# Patient Record
Sex: MALE | Race: BLACK OR AFRICAN AMERICAN | NOT HISPANIC OR LATINO | Employment: OTHER | URBAN - METROPOLITAN AREA
[De-identification: names, ages, dates, MRNs, and addresses within clinical notes are randomized per-mention and may not be internally consistent; named-entity substitution may affect disease eponyms.]

---

## 2017-09-20 ENCOUNTER — HOSPITAL ENCOUNTER (EMERGENCY)
Facility: HOSPITAL | Age: 57
Discharge: HOME | End: 2017-09-20
Attending: EMERGENCY MEDICINE

## 2017-09-20 VITALS
WEIGHT: 163 LBS | RESPIRATION RATE: 18 BRPM | BODY MASS INDEX: 27.16 KG/M2 | OXYGEN SATURATION: 96 % | HEIGHT: 65 IN | DIASTOLIC BLOOD PRESSURE: 88 MMHG | HEART RATE: 60 BPM | SYSTOLIC BLOOD PRESSURE: 129 MMHG | TEMPERATURE: 98 F

## 2017-09-20 DIAGNOSIS — S46.912A SHOULDER STRAIN, LEFT, INITIAL ENCOUNTER: ICD-10-CM
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
## 2017-09-20 DIAGNOSIS — M79.632 PAIN OF LEFT FOREARM: ICD-10-CM

## 2017-09-20 DIAGNOSIS — R07.81 RIB PAIN ON LEFT SIDE: ICD-10-CM

## 2017-09-20 DIAGNOSIS — S39.012A BACK STRAIN, INITIAL ENCOUNTER: ICD-10-CM

## 2017-09-20 PROCEDURE — 99283 EMERGENCY DEPT VISIT LOW MDM: CPT | Mod: 25

## 2017-09-20 PROCEDURE — 63600175 PHARM REV CODE 636 W HCPCS: Performed by: NURSE PRACTITIONER

## 2017-09-20 PROCEDURE — 96372 THER/PROPH/DIAG INJ SC/IM: CPT

## 2017-09-20 RX ORDER — METHOCARBAMOL 500 MG/1
1000 TABLET, FILM COATED ORAL 3 TIMES DAILY PRN
Qty: 18 TABLET | Refills: 0 | Status: SHIPPED | OUTPATIENT
Start: 2017-09-20

## 2017-09-20 RX ORDER — NAPROXEN 500 MG/1
500 TABLET ORAL 2 TIMES DAILY PRN
Qty: 10 TABLET | Refills: 0 | Status: SHIPPED | OUTPATIENT
Start: 2017-09-20 | End: 2017-09-25

## 2017-09-20 RX ORDER — KETOROLAC TROMETHAMINE 30 MG/ML
30 INJECTION, SOLUTION INTRAMUSCULAR; INTRAVENOUS
Status: COMPLETED | OUTPATIENT
Start: 2017-09-20 | End: 2017-09-20

## 2017-09-20 RX ADMIN — KETOROLAC TROMETHAMINE 30 MG: 30 INJECTION, SOLUTION INTRAMUSCULAR at 09:09
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
## 2017-09-21 NOTE — DISCHARGE INSTRUCTIONS
Please return to the Emergency Department for any new or worsening symptoms including: Worsening pain, any difficulty breathing, fever, chest pain, shortness of breath, loss of consciousness, dizziness, weakness, or any other concerns.     Please follow up with your Primary Care Provider within in the week. If you do not have one, you may contact the one listed on your discharge paperwork or you may also call the [facility] Appointment Desk at [phone] to schedule an appointment with one.     Please take all medication as prescribed. You have been prescribed Robaxin for muscle pain. Please do not take this medication while working, drinking alcohol, swimming, or while driving/operating heavy machinery. This medication may cause drowsiness, impair judgment, and reduce physical capabilities.    You have been prescribed Naproxen for pain. This is an Non-Steroidal Anti-Inflammatory (NSAID) Medication. Please do not take any additional NSAIDs while you are taking this medication including (Advil, Aleve, Motrin, Ibuprofen, Mobic\meloxicam, Naprosyn, etc.). Please stop taking this medication if you experience: weakness, itching, yellow skin or eyes, joint pains, vomiting blood, blood or black stools, unusual weight gain, or swelling in your arms, legs, hands, or feet.

## 2018-01-12 ENCOUNTER — HOSPITAL ENCOUNTER (EMERGENCY)
Facility: OTHER | Age: 58
Discharge: HOME | End: 2018-01-12
Attending: EMERGENCY MEDICINE
Payer: COMMERCIAL

## 2018-01-12 VITALS
RESPIRATION RATE: 16 BRPM | TEMPERATURE: 98 F | WEIGHT: 165 LBS | DIASTOLIC BLOOD PRESSURE: 84 MMHG | BODY MASS INDEX: 27.49 KG/M2 | HEART RATE: 61 BPM | SYSTOLIC BLOOD PRESSURE: 144 MMHG | OXYGEN SATURATION: 97 % | HEIGHT: 65 IN

## 2018-01-12 DIAGNOSIS — Z76.0 MEDICATION REFILL: ICD-10-CM

## 2018-01-12 DIAGNOSIS — Z13.9 ENCOUNTER FOR MEDICAL SCREENING EXAMINATION: Primary | ICD-10-CM

## 2018-01-12 PROCEDURE — 99282 EMERGENCY DEPT VISIT SF MDM: CPT

## 2018-01-12 RX ORDER — LOSARTAN POTASSIUM 50 MG/1
50 TABLET ORAL DAILY
COMMUNITY
End: 2018-01-12

## 2018-01-12 RX ORDER — LOSARTAN POTASSIUM 50 MG/1
50 TABLET ORAL DAILY
Qty: 30 TABLET | Refills: 0 | Status: SHIPPED | OUTPATIENT
Start: 2018-01-12

## 2018-01-12 NOTE — ED PROVIDER NOTES
Encounter Date: 1/12/2018       History     Chief Complaint   Patient presents with    Medication Refill     Pt. reports he is about to run out of his Losartan 50 mg BP pills and is getting ready to go out of town. He is requesting a refill on his medication at this time.      No complaints of      The history is provided by the patient.   Medication Refill   This is a new problem. The current episode started less than 1 hour ago. The problem occurs constantly. The problem has not changed since onset.    Review of patient's allergies indicates:  No Known Allergies  Past Medical History:   Diagnosis Date    Hypertension      Past Surgical History:   Procedure Laterality Date    FRACTURE SURGERY       History reviewed. No pertinent family history.  Social History   Substance Use Topics    Smoking status: Former Smoker     Quit date: 1/12/2010    Smokeless tobacco: Never Used    Alcohol use 0.6 oz/week     1 Cans of beer per week      Comment: occassional     Review of Systems   Constitutional: Negative.    HENT: Negative.    Eyes: Negative.    Respiratory: Negative.    Cardiovascular: Negative.    Gastrointestinal: Negative.    Endocrine: Negative.    Genitourinary: Negative.    Musculoskeletal: Negative.    Skin: Negative.    Allergic/Immunologic: Negative.    Neurological: Negative.    Hematological: Negative.    Psychiatric/Behavioral: Negative.    All other systems reviewed and are negative.      Physical Exam     Initial Vitals [01/12/18 0925]   BP Pulse Resp Temp SpO2   (!) 144/84 61 16 98.1 °F (36.7 °C) 97 %      MAP       104         Physical Exam    Nursing note and vitals reviewed.  Constitutional: Vital signs are normal. He appears well-developed. He is active and cooperative.   HENT:   Head: Normocephalic and atraumatic.   Eyes: Conjunctivae, EOM and lids are normal. Pupils are equal, round, and reactive to light.   Neck: Trachea normal and full passive range of motion without pain. Neck supple. No  thyroid mass present.   Cardiovascular: Normal rate, regular rhythm, S1 normal, S2 normal, normal heart sounds, intact distal pulses and normal pulses.   Abdominal: Soft. Normal appearance, normal aorta and bowel sounds are normal.   Musculoskeletal: Normal range of motion.   Lymphadenopathy:     He has no axillary adenopathy.   Neurological: He is alert and oriented to person, place, and time.   Skin: Skin is warm, dry and intact.   Psychiatric: He has a normal mood and affect. His speech is normal and behavior is normal. Judgment and thought content normal. Cognition and memory are normal.         ED Course   Procedures  Labs Reviewed - No data to display                            ED Course      Clinical Impression:   The primary encounter diagnosis was Encounter for medical screening examination. A diagnosis of Medication refill was also pertinent to this visit.                           Smooth Thomas MD  01/12/18 2087

## 2020-03-11 ENCOUNTER — HOSPITAL ENCOUNTER (EMERGENCY)
Facility: HOSPITAL | Age: 60
Discharge: HOME | End: 2020-03-11
Attending: EMERGENCY MEDICINE
Payer: COMMERCIAL

## 2020-03-11 VITALS
HEART RATE: 85 BPM | HEIGHT: 65 IN | TEMPERATURE: 99 F | OXYGEN SATURATION: 97 % | BODY MASS INDEX: 27.32 KG/M2 | SYSTOLIC BLOOD PRESSURE: 141 MMHG | WEIGHT: 164 LBS | DIASTOLIC BLOOD PRESSURE: 111 MMHG | RESPIRATION RATE: 18 BRPM

## 2020-03-11 DIAGNOSIS — R05.9 COUGH: ICD-10-CM

## 2020-03-11 DIAGNOSIS — B34.9 VIRAL SYNDROME: ICD-10-CM

## 2020-03-11 DIAGNOSIS — J06.9 VIRAL URI WITH COUGH: Primary | ICD-10-CM

## 2020-03-11 LAB
CTP QC/QA: YES
POC MOLECULAR INFLUENZA A AGN: NEGATIVE
POC MOLECULAR INFLUENZA B AGN: NEGATIVE
POCT GLUCOSE: 144 MG/DL (ref 70–110)

## 2020-03-11 PROCEDURE — 87502 INFLUENZA DNA AMP PROBE: CPT

## 2020-03-11 PROCEDURE — 82962 GLUCOSE BLOOD TEST: CPT

## 2020-03-11 PROCEDURE — 63600175 PHARM REV CODE 636 W HCPCS: Performed by: NURSE PRACTITIONER

## 2020-03-11 PROCEDURE — 96372 THER/PROPH/DIAG INJ SC/IM: CPT

## 2020-03-11 PROCEDURE — 99284 EMERGENCY DEPT VISIT MOD MDM: CPT | Mod: 25

## 2020-03-11 RX ORDER — BENZONATATE 100 MG/1
100 CAPSULE ORAL 3 TIMES DAILY PRN
Qty: 20 CAPSULE | Refills: 0 | Status: SHIPPED | OUTPATIENT
Start: 2020-03-11

## 2020-03-11 RX ORDER — FLUTICASONE PROPIONATE 50 MCG
1 SPRAY, SUSPENSION (ML) NASAL 2 TIMES DAILY PRN
Qty: 15 G | Refills: 0 | Status: SHIPPED | OUTPATIENT
Start: 2020-03-11

## 2020-03-11 RX ORDER — CETIRIZINE HYDROCHLORIDE 10 MG/1
10 TABLET ORAL DAILY
Qty: 30 TABLET | Refills: 0 | Status: SHIPPED | OUTPATIENT
Start: 2020-03-11

## 2020-03-11 RX ORDER — METFORMIN HYDROCHLORIDE EXTENDED-RELEASE TABLETS 1000 MG/1
1000 TABLET, FILM COATED, EXTENDED RELEASE ORAL
COMMUNITY

## 2020-03-11 RX ORDER — KETOROLAC TROMETHAMINE 30 MG/ML
30 INJECTION, SOLUTION INTRAMUSCULAR; INTRAVENOUS
Status: COMPLETED | OUTPATIENT
Start: 2020-03-11 | End: 2020-03-11

## 2020-03-11 RX ADMIN — KETOROLAC TROMETHAMINE 30 MG: 30 INJECTION, SOLUTION INTRAMUSCULAR; INTRAVENOUS at 02:03

## 2020-03-11 NOTE — ED PROVIDER NOTES
Encounter Date: 3/11/2020       History     Chief Complaint   Patient presents with    Cough     Pt c/o cough and fever starting yesterday. RR unlabored. Pt in no distress. Drinking fluids in triage. Tolerating well      59-year-old male with a past medical history of diabetes, hyperlipidemia, and hypertension presenting for evaluation of sinus congestion, cough, rhinorrhea, sore throat, and subjective fevers that began 2 days ago.  Denies known sick contacts.  Denies chest pain, shortness of breath, headache, otalgia, sinus pain, abdominal pain, nausea, vomiting, or any additional symptoms. He has been taking TheraFlu without relief of symptoms. No other treatments attempted prior to arrival.        Review of patient's allergies indicates:  No Known Allergies  Past Medical History:   Diagnosis Date    Diabetes mellitus     Hyperlipemia     Hypertension      Past Surgical History:   Procedure Laterality Date    FRACTURE SURGERY       History reviewed. No pertinent family history.  Social History     Tobacco Use    Smoking status: Former Smoker     Last attempt to quit: 1/12/2010     Years since quitting: 10.1    Smokeless tobacco: Never Used   Substance Use Topics    Alcohol use: Yes     Alcohol/week: 1.0 standard drinks     Types: 1 Cans of beer per week     Comment: occassional    Drug use: No     Review of Systems   Constitutional: Positive for chills, fatigue and fever.   HENT: Positive for congestion and rhinorrhea. Negative for ear discharge, ear pain, postnasal drip, sinus pressure, sinus pain and sore throat.    Eyes: Negative for pain and redness.   Respiratory: Positive for cough. Negative for apnea, choking, chest tightness, shortness of breath, wheezing and stridor.    Cardiovascular: Negative for chest pain, palpitations and leg swelling.   Gastrointestinal: Negative for abdominal pain, diarrhea, nausea and vomiting.   Genitourinary: Negative for dysuria, flank pain, penile pain and urgency.    Musculoskeletal: Negative for arthralgias, back pain, gait problem, joint swelling, myalgias, neck pain and neck stiffness.   Skin: Negative for color change, pallor, rash and wound.   Neurological: Negative for dizziness, tremors, seizures, syncope and light-headedness.   Psychiatric/Behavioral: Negative for confusion. The patient is not nervous/anxious.        Physical Exam     Initial Vitals [03/11/20 1130]   BP Pulse Resp Temp SpO2   (!) 154/95 82 18 98.1 °F (36.7 °C) 97 %      MAP       --         Physical Exam    Vitals reviewed.  Constitutional: Vital signs are normal. He appears well-developed and well-nourished. He is not diaphoretic. He is active and cooperative.  Non-toxic appearance. He does not have a sickly appearance. He does not appear ill. No distress.   Afebrile, well appearing, no distress   HENT:   Head: Normocephalic and atraumatic.   Right Ear: Hearing, tympanic membrane, external ear and ear canal normal.   Left Ear: Hearing, tympanic membrane, external ear and ear canal normal.   Nose: Mucosal edema and rhinorrhea present. Right sinus exhibits no maxillary sinus tenderness and no frontal sinus tenderness. Left sinus exhibits no maxillary sinus tenderness and no frontal sinus tenderness.   Mouth/Throat: Uvula is midline, oropharynx is clear and moist and mucous membranes are normal. No trismus in the jaw. No oropharyngeal exudate, posterior oropharyngeal edema, posterior oropharyngeal erythema or tonsillar abscesses.   TMs and ear canals are normal bilaterally. No frontal or maxillary sinus tenderness.  There is bilateral coryza and rhinorrhea. No oropharyngeal abnormalities.  No trismus.  Mucous membranes are moist. Productive cough observed during exam.   Eyes: EOM are normal. Right eye exhibits no discharge. Left eye exhibits no discharge.   Neck: Trachea normal, normal range of motion, full passive range of motion without pain and phonation normal. Neck supple. No stridor present. No  tracheal tenderness, no spinous process tenderness and no muscular tenderness present. No tracheal deviation, no edema, no erythema and normal range of motion present. No neck rigidity.   Neck is supple with nonpainful active and passive range of motion. No nuchal rigidity.  No stridor or drooling.  No change in phonation   Cardiovascular: Normal rate.   Pulmonary/Chest: Effort normal and breath sounds normal. No accessory muscle usage or stridor. No tachypnea. No respiratory distress.   Respiratory effort is normal.  No tachypnea.  Lungs are clear to auscultation bilaterally in all fields   Abdominal: Soft. He exhibits no distension. There is no tenderness.   Musculoskeletal: Normal range of motion. He exhibits no tenderness.   Neurological: He is alert and oriented to person, place, and time. He has normal strength. No cranial nerve deficit.   Skin: Skin is warm, dry and intact. Capillary refill takes less than 2 seconds. No rash noted.   Psychiatric: He has a normal mood and affect. His behavior is normal. Judgment and thought content normal.         ED Course   Procedures  Labs Reviewed   POCT GLUCOSE - Abnormal; Notable for the following components:       Result Value    POCT Glucose 144 (*)     All other components within normal limits   POCT INFLUENZA A/B MOLECULAR   POCT GLUCOSE MONITORING CONTINUOUS          Imaging Results          X-Ray Chest PA And Lateral (Final result)  Result time 03/11/20 13:05:18    Final result by Yemi Bustillo MD (03/11/20 13:05:18)                 Impression:      No acute abnormality.      Electronically signed by: Yemi Bustillo MD  Date:    03/11/2020  Time:    13:05             Narrative:    EXAMINATION:  XR CHEST PA AND LATERAL    CLINICAL HISTORY:  Cough    TECHNIQUE:  PA and lateral views of the chest were performed.    COMPARISON:  09/20/2017    FINDINGS:  The lungs are clear, with normal appearance of pulmonary vasculature and no pleural effusion or pneumothorax.    The  cardiac silhouette is normal in size. The hilar and mediastinal contours are unremarkable.    Bones are intact.                                 Medical Decision Making:   History:   Old Medical Records: I decided to obtain old medical records.  Clinical Tests:   Lab Tests: Ordered and Reviewed  Radiological Study: Ordered and Reviewed  ED Management:  DDx:  Influenza, viral syndrome, strep pharyngitis, viral pharyngitis, otitis media, sinusitis, pneumonia, bronchitis, meningitis, sepsis, others    HPI and physical exam as above.      The patient appears to have a viral upper respiratory infection.  Based upon the history and physical exam the patient does not appear to have a serious bacterial infection such as pneumonia, sepsis, otitis media, bacterial sinusitis, strep pharyngitis, parapharyngeal or peritonsillar abscess, meningitis.  Influenza swab was negative. Chest x-ray without evidence of pneumonia or other acute cardiopulmonary pathology.  Respiratory effort is normal with no signs of increased work of breathing or respiratory distress. Lungs are clear to auscultation bilaterally in all fields. Mucous membranes are moist and the patient is tolerating P.O. without difficulty.  Patient is afebrile.  Patient is nontoxic, alert, active, and appears very well at this time just prior to discharge. I have given specific return precautions to the patient.  I will prescribe medications to treat the patient's symptoms.     The results and physical exam findings were reviewed with the patient. Advised them to follow up with his PCP for re-evaluation and further management.  ED return precautions given. All questions regarding diagnosis and plan were answered to the patient's fullest possible satisfaction. Patient expressed understanding of diagnosis, discharge instructions, and return precautions.                                 Clinical Impression:       ICD-10-CM ICD-9-CM   1. Viral URI with cough J06.9 465.9     B97.89    2. Cough R05 786.2   3. Viral syndrome B34.9 079.99         Disposition:   Disposition: Discharged  Condition: Stable     ED Disposition Condition    Discharge Stable        ED Prescriptions     Medication Sig Dispense Start Date End Date Auth. Provider    fluticasone propionate (FLONASE) 50 mcg/actuation nasal spray 1 spray (50 mcg total) by Each Nostril route 2 (two) times daily as needed. 15 g 3/11/2020  Kevin Land NP    cetirizine (ZYRTEC) 10 MG tablet Take 1 tablet (10 mg total) by mouth once daily. 30 tablet 3/11/2020  Kevin Land NP    benzonatate (TESSALON) 100 MG capsule Take 1 capsule (100 mg total) by mouth 3 (three) times daily as needed for Cough. 20 capsule 3/11/2020  Kevin Land NP    benzocaine-menthoL (CEPACOL SORE THROAT, TRAVIS-MEN,) 15-3.6 mg Lozg 1 lozenge by Mucous Membrane route every 3 (three) hours as needed (Sore Throat). 16 lozenge 3/11/2020  Kevin Land NP        Follow-up Information     Follow up With Specialties Details Why Contact Info    Starla Parada MD Pediatrics Schedule an appointment as soon as possible for a visit in 3 days For further evaluation [address] [address]  [phone]      [facility] Emergency Medicine Go to  If symptoms worsen, As needed [address]  [facility] [address] [phone]                                     Kevin Land NP  03/11/20 1416

## 2020-03-11 NOTE — DISCHARGE INSTRUCTIONS
Take medications as prescribed.  Drink plenty of water and other hydrating fluids.  Follow-up with your regular doctor, especially for symptoms not improve.    Return to the emergency department immediately for any new or worsening symptoms.    Thank you for coming to our Emergency Department today. It is important to remember that some problems are difficult to diagnose and may not be found during your first visit. Be sure to follow up with your primary care doctor.  If you do not have one, you may contact the one listed on your discharge paperwork or you may also call the [facility] Appointment Desk at [phone] to schedule an appointment with one.     Return to the ER with any questions/concerns, new/concerning symptoms, worsening or failure to improve. Do not drive or make any important decisions for 24 hours if you have received any pain medications, sedatives or mood altering drugs during your ER visit.

## 2020-03-16 ENCOUNTER — HOSPITAL ENCOUNTER (INPATIENT)
Facility: HOSPITAL | Age: 60
LOS: 5 days | Discharge: HOME | DRG: 189 | End: 2020-03-21
Attending: EMERGENCY MEDICINE | Admitting: EMERGENCY MEDICINE
Payer: COMMERCIAL

## 2020-03-16 DIAGNOSIS — R06.02 SOB (SHORTNESS OF BREATH): ICD-10-CM

## 2020-03-16 DIAGNOSIS — R79.89 ELEVATED LFTS: ICD-10-CM

## 2020-03-16 DIAGNOSIS — R09.02 HYPOXIA: Primary | ICD-10-CM

## 2020-03-16 DIAGNOSIS — J18.9 PNEUMONIA OF BOTH LUNGS DUE TO INFECTIOUS ORGANISM, UNSPECIFIED PART OF LUNG: ICD-10-CM

## 2020-03-16 DIAGNOSIS — Z20.822 SUSPECTED COVID-19 VIRUS INFECTION: ICD-10-CM

## 2020-03-16 DIAGNOSIS — R50.9 FEVER, UNSPECIFIED FEVER CAUSE: ICD-10-CM

## 2020-03-16 LAB
ALBUMIN SERPL BCP-MCNC: 3.4 G/DL (ref 3.5–5.2)
ALP SERPL-CCNC: 147 U/L (ref 55–135)
ALT SERPL W/O P-5'-P-CCNC: 100 U/L (ref 10–44)
ANION GAP SERPL CALC-SCNC: 15 MMOL/L (ref 8–16)
AST SERPL-CCNC: 68 U/L (ref 10–40)
BASOPHILS # BLD AUTO: 0.01 K/UL (ref 0–0.2)
BASOPHILS NFR BLD: 0.2 % (ref 0–1.9)
BILIRUB SERPL-MCNC: 0.3 MG/DL (ref 0.1–1)
BNP SERPL-MCNC: <10 PG/ML (ref 0–99)
BUN SERPL-MCNC: 20 MG/DL (ref 6–20)
CALCIUM SERPL-MCNC: 8.5 MG/DL (ref 8.7–10.5)
CHLORIDE SERPL-SCNC: 104 MMOL/L (ref 95–110)
CO2 SERPL-SCNC: 18 MMOL/L (ref 23–29)
CREAT SERPL-MCNC: 1.1 MG/DL (ref 0.5–1.4)
CRP SERPL-MCNC: 76.8 MG/L (ref 0–8.2)
CTP QC/QA: YES
DIFFERENTIAL METHOD: ABNORMAL
EOSINOPHIL # BLD AUTO: 0 K/UL (ref 0–0.5)
EOSINOPHIL NFR BLD: 0 % (ref 0–8)
ERYTHROCYTE [DISTWIDTH] IN BLOOD BY AUTOMATED COUNT: 13.7 % (ref 11.5–14.5)
EST. GFR  (AFRICAN AMERICAN): >60 ML/MIN/1.73 M^2
EST. GFR  (NON AFRICAN AMERICAN): >60 ML/MIN/1.73 M^2
GLUCOSE SERPL-MCNC: 98 MG/DL (ref 70–110)
HCT VFR BLD AUTO: 46.4 % (ref 40–54)
HGB BLD-MCNC: 14.8 G/DL (ref 14–18)
IMM GRANULOCYTES # BLD AUTO: 0.02 K/UL (ref 0–0.04)
IMM GRANULOCYTES NFR BLD AUTO: 0.3 % (ref 0–0.5)
LYMPHOCYTES # BLD AUTO: 1.1 K/UL (ref 1–4.8)
LYMPHOCYTES NFR BLD: 19.2 % (ref 18–48)
MCH RBC QN AUTO: 27.9 PG (ref 27–31)
MCHC RBC AUTO-ENTMCNC: 31.9 G/DL (ref 32–36)
MCV RBC AUTO: 88 FL (ref 82–98)
MONOCYTES # BLD AUTO: 0.4 K/UL (ref 0.3–1)
MONOCYTES NFR BLD: 6.4 % (ref 4–15)
NEUTROPHILS # BLD AUTO: 4.4 K/UL (ref 1.8–7.7)
NEUTROPHILS NFR BLD: 73.9 % (ref 38–73)
NRBC BLD-RTO: 0 /100 WBC
PLATELET # BLD AUTO: 201 K/UL (ref 150–350)
PMV BLD AUTO: 9.9 FL (ref 9.2–12.9)
POC MOLECULAR INFLUENZA A AGN: NEGATIVE
POC MOLECULAR INFLUENZA B AGN: NEGATIVE
POTASSIUM SERPL-SCNC: 3.7 MMOL/L (ref 3.5–5.1)
PROCALCITONIN SERPL IA-MCNC: 0.33 NG/ML
PROT SERPL-MCNC: 7.5 G/DL (ref 6–8.4)
RBC # BLD AUTO: 5.3 M/UL (ref 4.6–6.2)
SODIUM SERPL-SCNC: 137 MMOL/L (ref 136–145)
TROPONIN I SERPL DL<=0.01 NG/ML-MCNC: <0.006 NG/ML (ref 0–0.03)
WBC # BLD AUTO: 5.94 K/UL (ref 3.9–12.7)

## 2020-03-16 PROCEDURE — 83880 ASSAY OF NATRIURETIC PEPTIDE: CPT

## 2020-03-16 PROCEDURE — 84145 PROCALCITONIN (PCT): CPT

## 2020-03-16 PROCEDURE — U0002 COVID-19 LAB TEST NON-CDC: HCPCS

## 2020-03-16 PROCEDURE — 25000003 PHARM REV CODE 250: Performed by: PHYSICIAN ASSISTANT

## 2020-03-16 PROCEDURE — 93005 ELECTROCARDIOGRAM TRACING: CPT

## 2020-03-16 PROCEDURE — 93010 ELECTROCARDIOGRAM REPORT: CPT | Mod: ,,, | Performed by: INTERNAL MEDICINE

## 2020-03-16 PROCEDURE — 83036 HEMOGLOBIN GLYCOSYLATED A1C: CPT

## 2020-03-16 PROCEDURE — 99285 EMERGENCY DEPT VISIT HI MDM: CPT | Mod: 25

## 2020-03-16 PROCEDURE — 85025 COMPLETE CBC W/AUTO DIFF WBC: CPT

## 2020-03-16 PROCEDURE — 84484 ASSAY OF TROPONIN QUANT: CPT

## 2020-03-16 PROCEDURE — 93010 EKG 12-LEAD: ICD-10-PCS | Mod: ,,, | Performed by: INTERNAL MEDICINE

## 2020-03-16 PROCEDURE — 12000002 HC ACUTE/MED SURGE SEMI-PRIVATE ROOM

## 2020-03-16 PROCEDURE — 21400001 HC TELEMETRY ROOM

## 2020-03-16 PROCEDURE — 80053 COMPREHEN METABOLIC PANEL: CPT

## 2020-03-16 PROCEDURE — 87502 INFLUENZA DNA AMP PROBE: CPT

## 2020-03-16 PROCEDURE — 86140 C-REACTIVE PROTEIN: CPT

## 2020-03-16 RX ORDER — ACETAMINOPHEN 500 MG
500 TABLET ORAL
Status: COMPLETED | OUTPATIENT
Start: 2020-03-16 | End: 2020-03-16

## 2020-03-16 RX ORDER — IBUPROFEN 600 MG/1
600 TABLET ORAL
Status: COMPLETED | OUTPATIENT
Start: 2020-03-16 | End: 2020-03-16

## 2020-03-16 RX ORDER — HYDROCODONE BITARTRATE AND ACETAMINOPHEN 5; 325 MG/1; MG/1
1 TABLET ORAL EVERY 4 HOURS PRN
Status: DISCONTINUED | OUTPATIENT
Start: 2020-03-16 | End: 2020-03-17

## 2020-03-16 RX ORDER — ONDANSETRON 2 MG/ML
4 INJECTION INTRAMUSCULAR; INTRAVENOUS EVERY 8 HOURS PRN
Status: DISCONTINUED | OUTPATIENT
Start: 2020-03-16 | End: 2020-03-21 | Stop reason: HOSPADM

## 2020-03-16 RX ORDER — OXYCODONE HYDROCHLORIDE 5 MG/1
10 TABLET ORAL EVERY 4 HOURS PRN
Status: DISCONTINUED | OUTPATIENT
Start: 2020-03-16 | End: 2020-03-17

## 2020-03-16 RX ORDER — ACETAMINOPHEN 325 MG/1
650 TABLET ORAL EVERY 8 HOURS PRN
Status: DISCONTINUED | OUTPATIENT
Start: 2020-03-16 | End: 2020-03-17

## 2020-03-16 RX ORDER — SODIUM CHLORIDE 0.9 % (FLUSH) 0.9 %
10 SYRINGE (ML) INJECTION
Status: DISCONTINUED | OUTPATIENT
Start: 2020-03-16 | End: 2020-03-21 | Stop reason: HOSPADM

## 2020-03-16 RX ADMIN — ACETAMINOPHEN 500 MG: 500 TABLET ORAL at 06:03

## 2020-03-16 RX ADMIN — IBUPROFEN 600 MG: 600 TABLET, FILM COATED ORAL at 05:03

## 2020-03-16 NOTE — ED PROVIDER NOTES
Encounter Date: 3/16/2020       History     Chief Complaint   Patient presents with    Shortness of Breath     SOB, fever/chills, cough approx 8 days ago. pt reports visiting ED recently and was tested negative for the flu. pt denies COPD/asthma      Chief complaint:  Shortness of breath    HPI:    59-year-old male presented hypertension, hyperlipidemia, diabetes presenting for evaluation of 8 day history of dry cough with associated intermittent substernal chest pain worse with coughing and intermittent SOB.  Patient additionally reports 1 episode of vomiting and 1 episode of diarrhea per day for the past week.  He reports he had a syncopal episode with loss of consciousness lasting 2 minutes during which he became lightheaded while vomiting and having a bowel movement simultaneously last night.  He endorses some epigastric pain and nausea.  Was evaluated this facility 6 days ago and had negative influenza swab and was diagnosed with viral URI with cough.  He has been attempting treatment ibuprofen and Tylenol without relief of symptoms. He denies nasal congestion, rhinorrhea, ear pain, melena, hematochezia, sick contacts, lower extremity swelling.         Review of patient's allergies indicates:  No Known Allergies  Past Medical History:   Diagnosis Date    Diabetes mellitus     Hyperlipemia     Hypertension      Past Surgical History:   Procedure Laterality Date    FRACTURE SURGERY       No family history on file.  Social History     Tobacco Use    Smoking status: Former Smoker     Last attempt to quit: 1/12/2010     Years since quitting: 10.1    Smokeless tobacco: Never Used   Substance Use Topics    Alcohol use: Yes     Alcohol/week: 1.0 standard drinks     Types: 1 Cans of beer per week     Comment: occassional    Drug use: No     Review of Systems   Constitutional: Positive for appetite change, chills and fever.   HENT: Negative for congestion, ear pain, rhinorrhea and sore throat.    Eyes: Negative  for redness.   Respiratory: Positive for cough and shortness of breath.    Cardiovascular: Positive for chest pain. Negative for leg swelling.   Gastrointestinal: Positive for abdominal pain, diarrhea, nausea and vomiting. Negative for blood in stool.   Genitourinary: Negative for difficulty urinating.   Musculoskeletal: Negative for back pain and neck pain.   Skin: Negative for rash.   Neurological: Negative for speech difficulty.   Psychiatric/Behavioral: Negative for confusion.       Physical Exam     Initial Vitals [03/16/20 1625]   BP Pulse Resp Temp SpO2   (!) 149/77 77 (!) 26 (!) 100.9 °F (38.3 °C) 98 %      MAP       --         Physical Exam    Nursing note and vitals reviewed.  Constitutional: He appears well-developed and well-nourished. No distress.   HENT:   Head: Normocephalic.   Right Ear: Hearing, tympanic membrane, external ear and ear canal normal.   Left Ear: Hearing, tympanic membrane, external ear and ear canal normal.   Nose: Nose normal.   Mouth/Throat: Oropharynx is clear and moist. No oropharyngeal exudate, posterior oropharyngeal edema or posterior oropharyngeal erythema.   Eyes: Conjunctivae are normal.   Neck: Neck supple.   Cardiovascular: Normal rate and regular rhythm. Exam reveals no gallop and no friction rub.    No murmur heard.  Pulmonary/Chest: Tachypnea noted. No respiratory distress. He has decreased breath sounds. He has no wheezes. He has no rhonchi. He has no rales.   Abdominal: Soft. Bowel sounds are normal. He exhibits no distension. There is generalized tenderness. There is no rigidity, no rebound, no guarding and no CVA tenderness.   Lymphadenopathy:     He has no cervical adenopathy.   Neurological: He is alert.   Skin: Skin is warm and dry. No rash noted.   No lower extremity swelling \     Psychiatric: He has a normal mood and affect.         ED Course   Procedures  Labs Reviewed   CBC W/ AUTO DIFFERENTIAL - Abnormal; Notable for the following components:       Result  Value    Mean Corpuscular Hemoglobin Conc 31.9 (*)     Gran% 73.9 (*)     All other components within normal limits   COMPREHENSIVE METABOLIC PANEL - Abnormal; Notable for the following components:    CO2 18 (*)     Calcium 8.5 (*)     Albumin 3.4 (*)     Alkaline Phosphatase 147 (*)     AST 68 (*)      (*)     All other components within normal limits    Narrative:     Recoll. [id number] by LM1 at 03/16/2020 17:44, reason:   HEMOLYZED/DISCARDED/RANDAL   TROPONIN I    Narrative:     Recoll. [id number] by LM1 at 03/16/2020 17:44, reason:   HEMOLYZED/DISCARDED/RANDAL   B-TYPE NATRIURETIC PEPTIDE    Narrative:     Recoll. [id number] by LM1 at 03/16/2020 17:44, reason:   HEMOLYZED/DISCARDED/RANDAL   PROCALCITONIN   C-REACTIVE PROTEIN   SARS-COV-2 (COVID-19) QUALITATIVE PCR   POCT INFLUENZA A/B MOLECULAR        ECG Results          EKG 12-lead (Preliminary result)  Result time 03/16/20 19:09:47    ED Interpretation by Siri Vega MD (03/16/20 19:09:47)    NSR, rate 76 bpm, normal MO interval, QTc 416 ms, no STEMI, no malignant dysrhythmia                            Imaging Results          X-Ray Chest 1 View (Final result)  Result time 03/16/20 16:54:01    Final result by Ilya Riddle MD (03/16/20 16:54:01)                 Impression:      Interval bilateral diffuse nonspecific interstitial coarsening suggesting pulmonary edema versus infectious or inflammatory interstitial pneumonia.  No focal consolidation.      Electronically signed by: Ilya Riddle MD  Date:    03/16/2020  Time:    16:54             Narrative:    EXAMINATION:  XR CHEST 1 VIEW    CLINICAL HISTORY:  Shortness of breath    TECHNIQUE:  Single frontal view of the chest was performed.    COMPARISON:  Chest radiograph 03/11/2020    FINDINGS:  Monitoring leads overlie the chest.  Cardiomediastinal silhouette is midline and within normal limits for age.    Interval bilateral diffuse nonspecific interstitial coarsening, slightly more  prominent on the left.  The lungs are otherwise symmetrically well expanded without focal consolidation, pleural effusion or pneumothorax.  Osseous structures are intact.                                 Medical Decision Making:   Initial Assessment:   67-year-old male with history of hypertension, diabetes hyperlipidemia presenting for evaluation of a day history of fever, cough and shortness of breath.  Patient is febrile, tachycardic, not hypoxic.  SpO2 87% with ambulation.  Cardiac workup is negative.  CBC without leukocytosis.  CMP with transaminitis.  Influenza swab is negative.  Chest x-ray is remarkable for interstitial coarsening suggesting pulmonary edema versus infectious or inflammatory interstitial pneumonia. Discussed pt with Dr Vega who discussed the pt with Dr. Martinez. Pt accepted for admission and testing for COVID 19. CRP elevated. Procalcitonin pending. Rocephin IV and azithromycin ordered. Pt vitals stable on 4L O2.  Discussed pt with Dr. Vega who also evaluated pt face to face and she agrees with assessment and plan.                                  Clinical Impression:       ICD-10-CM ICD-9-CM   1. Hypoxia R09.02 799.02   2. SOB (shortness of breath) R06.02 786.05   3. Fever, unspecified fever cause R50.9 780.60   4. Pneumonia of both lungs due to infectious organism, unspecified part of lung J18.9 483.8   5. Elevated LFTs R94.5 790.6             ED Disposition Condition    Admit                      Alexandra Whitt PA-C  03/16/20 2031

## 2020-03-16 NOTE — ED TRIAGE NOTES
Pt arrived to the ED due to SOB, cough, fever/chills x 8 days. Pt recently visited the ED about a week ago and tested negative for the flu

## 2020-03-17 PROBLEM — E78.5 HYPERLIPIDEMIA: Status: ACTIVE | Noted: 2020-03-17

## 2020-03-17 PROBLEM — Z20.822 SUSPECTED COVID-19 VIRUS INFECTION: Status: ACTIVE | Noted: 2020-03-17

## 2020-03-17 PROBLEM — J96.01 ACUTE HYPOXEMIC RESPIRATORY FAILURE: Status: ACTIVE | Noted: 2020-03-17

## 2020-03-17 PROBLEM — R50.9 FEBRILE: Status: ACTIVE | Noted: 2020-03-17

## 2020-03-17 PROBLEM — I10 ESSENTIAL HYPERTENSION: Status: ACTIVE | Noted: 2020-03-17

## 2020-03-17 PROBLEM — R74.01 TRANSAMINITIS: Status: ACTIVE | Noted: 2020-03-17

## 2020-03-17 PROBLEM — E11.9 DIABETES MELLITUS, TYPE 2: Status: ACTIVE | Noted: 2020-03-17

## 2020-03-17 LAB
FERRITIN SERPL-MCNC: 2378 NG/ML (ref 20–300)
HAV IGM SERPL QL IA: NEGATIVE
HBV CORE IGM SERPL QL IA: NEGATIVE
HBV SURFACE AG SERPL QL IA: NEGATIVE
HCV AB SERPL QL IA: NEGATIVE
HIV1+2 IGG SERPL QL IA.RAPID: NORMAL
POCT GLUCOSE: 113 MG/DL (ref 70–110)
POCT GLUCOSE: 119 MG/DL (ref 70–110)
POCT GLUCOSE: 69 MG/DL (ref 70–110)
POCT GLUCOSE: 88 MG/DL (ref 70–110)
POCT GLUCOSE: 99 MG/DL (ref 70–110)

## 2020-03-17 PROCEDURE — 80074 ACUTE HEPATITIS PANEL: CPT

## 2020-03-17 PROCEDURE — 25000003 PHARM REV CODE 250: Performed by: HOSPITALIST

## 2020-03-17 PROCEDURE — 63600175 PHARM REV CODE 636 W HCPCS: Performed by: PHYSICIAN ASSISTANT

## 2020-03-17 PROCEDURE — 21400001 HC TELEMETRY ROOM

## 2020-03-17 PROCEDURE — 25000003 PHARM REV CODE 250: Performed by: PHYSICIAN ASSISTANT

## 2020-03-17 PROCEDURE — 86703 HIV-1/HIV-2 1 RESULT ANTBDY: CPT

## 2020-03-17 PROCEDURE — 36415 COLL VENOUS BLD VENIPUNCTURE: CPT

## 2020-03-17 PROCEDURE — 83520 IMMUNOASSAY QUANT NOS NONAB: CPT

## 2020-03-17 PROCEDURE — 82728 ASSAY OF FERRITIN: CPT

## 2020-03-17 RX ORDER — GLUCAGON 1 MG
1 KIT INJECTION
Status: DISCONTINUED | OUTPATIENT
Start: 2020-03-17 | End: 2020-03-19

## 2020-03-17 RX ORDER — BENZONATATE 100 MG/1
100 CAPSULE ORAL 3 TIMES DAILY PRN
Status: DISCONTINUED | OUTPATIENT
Start: 2020-03-17 | End: 2020-03-21 | Stop reason: HOSPADM

## 2020-03-17 RX ORDER — IBUPROFEN 200 MG
24 TABLET ORAL
Status: DISCONTINUED | OUTPATIENT
Start: 2020-03-17 | End: 2020-03-19

## 2020-03-17 RX ORDER — GUAIFENESIN/DEXTROMETHORPHAN 100-10MG/5
5 SYRUP ORAL 2 TIMES DAILY
Status: DISCONTINUED | OUTPATIENT
Start: 2020-03-17 | End: 2020-03-21 | Stop reason: HOSPADM

## 2020-03-17 RX ORDER — INSULIN ASPART 100 [IU]/ML
1-10 INJECTION, SOLUTION INTRAVENOUS; SUBCUTANEOUS
Status: DISCONTINUED | OUTPATIENT
Start: 2020-03-17 | End: 2020-03-19

## 2020-03-17 RX ORDER — LOSARTAN POTASSIUM 25 MG/1
50 TABLET ORAL DAILY
Status: DISCONTINUED | OUTPATIENT
Start: 2020-03-17 | End: 2020-03-21 | Stop reason: HOSPADM

## 2020-03-17 RX ORDER — IBUPROFEN 200 MG
16 TABLET ORAL
Status: DISCONTINUED | OUTPATIENT
Start: 2020-03-17 | End: 2020-03-19

## 2020-03-17 RX ORDER — ACETAMINOPHEN 325 MG/1
650 TABLET ORAL EVERY 4 HOURS PRN
Status: DISCONTINUED | OUTPATIENT
Start: 2020-03-17 | End: 2020-03-21 | Stop reason: HOSPADM

## 2020-03-17 RX ORDER — CETIRIZINE HYDROCHLORIDE 10 MG/1
10 TABLET ORAL DAILY
Status: DISCONTINUED | OUTPATIENT
Start: 2020-03-17 | End: 2020-03-21 | Stop reason: HOSPADM

## 2020-03-17 RX ORDER — HYDROCODONE BITARTRATE AND ACETAMINOPHEN 5; 325 MG/1; MG/1
1 TABLET ORAL EVERY 4 HOURS PRN
Status: DISCONTINUED | OUTPATIENT
Start: 2020-03-17 | End: 2020-03-21 | Stop reason: HOSPADM

## 2020-03-17 RX ADMIN — HYDROCODONE BITARTRATE AND ACETAMINOPHEN 1 TABLET: 5; 325 TABLET ORAL at 12:03

## 2020-03-17 RX ADMIN — GUAIFENESIN AND DEXTROMETHORPHAN 5 ML: 100; 10 SYRUP ORAL at 09:03

## 2020-03-17 RX ADMIN — ACETAMINOPHEN 650 MG: 325 TABLET ORAL at 06:03

## 2020-03-17 RX ADMIN — CEFTRIAXONE 1 G: 1 INJECTION, SOLUTION INTRAVENOUS at 03:03

## 2020-03-17 RX ADMIN — AZITHROMYCIN MONOHYDRATE 500 MG: 500 INJECTION, POWDER, LYOPHILIZED, FOR SOLUTION INTRAVENOUS at 01:03

## 2020-03-17 RX ADMIN — CETIRIZINE HYDROCHLORIDE 10 MG: 10 TABLET, FILM COATED ORAL at 04:03

## 2020-03-17 RX ADMIN — ACETAMINOPHEN 650 MG: 325 TABLET ORAL at 09:03

## 2020-03-17 RX ADMIN — LOSARTAN POTASSIUM 50 MG: 25 TABLET ORAL at 04:03

## 2020-03-17 NOTE — H&P
[facility] Hospital Medicine  History & Physical    Patient Name: Juice Sutherland  MRN: [id number]  Admission Date: 03/17/2020  Attending Physician: Smooth Martniez MD, MPH      PCP:     Starla Parada MD    CC:     Chief Complaint   Patient presents with    Shortness of Breath     SOB, fever/chills, cough approx 8 days ago. pt reports visiting ED recently and was tested negative for the flu. pt denies COPD/asthma        HISTORY OF PRESENT ILLNESS:     Juice Sutherland is a 59 y.o. male that (in part)  has a past medical history of Diabetes mellitus, Hyperlipemia, and Hypertension.  has a past surgical history that includes Fracture surgery. Presents to [facility] Emergency Department shortness of breath associated with fever, chills, and cough.  Began 8 days ago with progressive worsening.  Was initially evaluated in the emergency department, tested negative for the flu, but was not hypoxemic therefore discharged to home.  Re-presented night with similar worsening symptoms.  Was not hypoxemic at rest, but oxygen levels desaturated to 87% with ambulation.      [place] but has been out for the last 6 months due to a rotator cuff injury.  He went to [facility] and was around [facility] [place] with his girlfriend in several friends.  None of them have reported any illnesses according to the patient.    He last worked 6 months ago on the [employer] but has been out of work due to a rotator cuff injury.  Denies recent travel, cruise trips, or use of [employer]/lift services.      In the emergency department routine laboratory studies, influenza testing, cardiac enzymes, and chest x-ray was obtained.  Chest x-ray concerning for bilateral diffuse nonspecific interstitial coarsening suggested of pulmonary edema versus infectious or inflammatory interstitial pneumonia.  No focal consolidations were identified.  Findings were suggestive of COVID-19 given  the negative influenza testing, fever, transaminitis, low normal procalcitonin, and markedly elevated CRP levels.    In the event that he cannot make decisions he wishes for his sister, Ary, to make them for him.  Code status discussed with patient and he wants to remain a full code including ventilator support if necessary.    Hospital medicine has been asked to admit to inpatient for further evaluation and treatment for acute hypoxemic respiratory failure and suspected COVID-19.       REVIEW OF SYSTEMS:     -- Constitutional:  Positive for fever chills, and generalized malaise.  -- Eyes: No visual changes, diplopia, pain, tearing, blind spots, or discharge.   -- Ears, nose, mouth, throat, and face: No congestion, sore throat, epistaxis, d/c, bleeding gums, neck stiffness masses, or dental issues.  -- Respiratory:  As above in the HPI.  -- Cardiovascular: No chest pain, VELASCO, syncope, PND, edema, cyanosis, or palpitations.   -- Gastrointestinal: No vomiting, abdominal pain, hematemesis, melena, dyspepsia, or change in bowel habits.  -- Genitourinary: No hematuria, dysuria, frequency, urgency, nocturia, polyuria, stones, or incontinence.  -- Integument/breast: No rash, pruritis, pigmentation changes, dryness, or changes in hair  -- Hematologic/lymphatic: No easy bruising or lymphadenopathy.   -- Musculoskeletal: No acute arthralgias, acute myalgias, joint swelling, acute limitations of ROM, or acute muscular weakness.  -- Neurological: No seizures, headaches, incoordination, paraesthesias, ataxia, vertigo, or tremors.  -- Behavioral/Psych: No auditory or visual hallucinations, depression, or suicidal/homicidal ideations.  -- Endocrine: No heat or cold intolerance, polydipsia, or unintentional weight gain / loss.  -- Allergy/Immunologic: No recurrent infections or adverse reaction to food, insects, or difficulty breathing.      PAST MEDICAL / SURGICAL HISTORY:     Past Medical History:   Diagnosis Date     Diabetes mellitus     Hyperlipemia     Hypertension      Past Surgical History:   Procedure Laterality Date    FRACTURE SURGERY           FAMILY HISTORY:     Family history of cardiovascular disease      SOCIAL HISTORY:     Social History     Socioeconomic History    Marital status:      Spouse name: Not on file    Number of children: Not on file    Years of education: Not on file    Highest education level: Not on file   Occupational History    Not on file   Social Needs    Financial resource strain: Not on file    Food insecurity:     Worry: Not on file     Inability: Not on file    Transportation needs:     Medical: Not on file     Non-medical: Not on file   Tobacco Use    Smoking status: Former Smoker     Last attempt to quit: 1/12/2010     Years since quitting: 10.1    Smokeless tobacco: Never Used   Substance and Sexual Activity    Alcohol use: Yes     Alcohol/week: 1.0 standard drinks     Types: 1 Cans of beer per week     Comment: occassional    Drug use: No    Sexual activity: Yes     Partners: Female     Birth control/protection: None   Lifestyle    Physical activity:     Days per week: Not on file     Minutes per session: Not on file    Stress: Not on file   Relationships    Social connections:     Talks on phone: Not on file     Gets together: Not on file     Attends [employer] service: Not on file     Active member of club or organization: Not on file     Attends meetings of clubs or organizations: Not on file     Relationship status: Not on file   Other Topics Concern    Not on file   Social History Narrative    Not on file         ALLERGIES:       Review of patient's allergies indicates:  No Known Allergies      HEALTH SCREENING:     Influenza vaccine not up-to-date for this season.  Prevnar 13 pneumonia vaccine = no evidence of previous vaccination found in the medical record      HOME MEDICATIONS:     Prior to Admission medications    Medication Sig Start Date End Date  Taking? Authorizing Provider   benzocaine-menthoL (CEPACOL SORE THROAT, TRAVIS-MEN,) 15-3.6 mg Lozg 1 lozenge by Mucous Membrane route every 3 (three) hours as needed (Sore Throat). 3/11/20  Yes Kevin Land NP   benzonatate (TESSALON) 100 MG capsule Take 1 capsule (100 mg total) by mouth 3 (three) times daily as needed for Cough. 3/11/20  Yes Kevin Land NP   cetirizine (ZYRTEC) 10 MG tablet Take 1 tablet (10 mg total) by mouth once daily. 3/11/20  Yes Kevin Land NP   fluticasone propionate (FLONASE) 50 mcg/actuation nasal spray 1 spray (50 mcg total) by Each Nostril route 2 (two) times daily as needed. 3/11/20  Yes Kevin Land NP   Lactobacillus rhamnosus GG (CULTURELLE) 10 billion cell capsule Take 1 capsule by mouth once daily.   Yes Historical Provider, MD   losartan (COZAAR) 50 MG tablet Take 1 tablet (50 mg total) by mouth once daily. 1/12/18  Yes Smooth Thomas MD   metFORMIN (FORTAMET) 1,000 mg 24hr tablet Take 1,000 mg by mouth daily with breakfast.   Yes Historical Provider, MD   methocarbamol (ROBAXIN) 500 MG Tab Take 2 tablets (1,000 mg total) by mouth 3 (three) times daily as needed (Muscle Spasm/Pain). 9/20/17  Yes Billy Alexander, [facility] MEDICATIONS:     Scheduled Meds:    [START ON 3/18/2020] azithromycin  500 mg Intravenous Q24H    cefTRIAXone (ROCEPHIN) IVPB  1 g Intravenous Q24H     Continuous Infusions:   PRN Meds: acetaminophen, HYDROcodone-acetaminophen, ondansetron, oxyCODONE, promethazine (PHENERGAN) IVPB, sodium chloride 0.9%      PHYSICAL EXAM:     Wt Readings from Last 1 Encounters:   03/17/20 0337 72.9 kg (160 lb 11.5 oz)   03/17/20 0200 74.4 kg (164 lb 0.4 oz)   03/16/20 1625 74.4 kg (164 lb)     Body mass index is 26.74 kg/m².  Vitals:    03/16/20 2322 03/16/20 2358 03/17/20 0200 03/17/20 0337   BP:  (!) 156/76  (!) 140/78   BP Location:  Left arm  Left arm   Patient Position:  Lying  Lying   Pulse: 62 68  70   Resp: 18 19  19   Temp: 98.4 °F (36.9  "°C) 98.1 °F (36.7 °C)  98.6 °F (37 °C)   TempSrc:  Oral  Oral   SpO2: 98% (!) 93%  97%   Weight:   74.4 kg (164 lb 0.4 oz) 72.9 kg (160 lb 11.5 oz)   Height:   5' 5" (1.651 m)           -- General appearance:  Ill-appearing.  well developed. appears stated age   -- Head: normocephalic, atraumatic   -- Eyes: conjunctivae clear. Extraocular muscles intact  -- Nose: Nares normal. Septum midline.   -- Mouth/Throat: lips, mucosa, and tongue normal. no throat erythema.   -- Neck: supple, symmetrical, trachea midline, no JVD and thyroid not grossly enlarged, appears symmetric  -- Lungs:  Increased respiratory rate with normal respiratory effort.  Rales and rhonchi bilaterally.   -- Chest wall: no tenderness. equal bilateral chest rise   -- Heart: regular rate and regular rhythm. S1, S2 normal.  no click, rub or gallop   -- Abdomen: soft, non-tender, non-distended, non-tympanic; bowel sounds normal; no masses  -- Extremities: no cyanosis, clubbing or edema.   -- Pulses: 2+ and symmetric   -- Skin:  Diaphoretic.  Turgor normal. Color normal. Texture normal. No rashes or lesions.   -- Neurologic: Normal strength and tone. No focal numbness or weakness. CNII-XII intact. Gabby coma scale: eyes open spontaneously-4, oriented & converses-5, obeys commands-6.      LABORATORY STUDIES:     Recent Results (from the past 36 hour(s))   CBC auto differential    Collection Time: 03/16/20  5:00 PM   Result Value Ref Range    WBC 5.94 3.90 - 12.70 K/uL    RBC 5.30 4.60 - 6.20 M/uL    Hemoglobin 14.8 14.0 - 18.0 g/dL    Hematocrit 46.4 40.0 - 54.0 %    Mean Corpuscular Volume 88 82 - 98 fL    Mean Corpuscular Hemoglobin 27.9 27.0 - 31.0 pg    Mean Corpuscular Hemoglobin Conc 31.9 (L) 32.0 - 36.0 g/dL    RDW 13.7 11.5 - 14.5 %    Platelets 201 150 - 350 K/uL    MPV 9.9 9.2 - 12.9 fL    Immature Granulocytes 0.3 0.0 - 0.5 %    Gran # (ANC) 4.4 1.8 - 7.7 K/uL    Immature Grans (Abs) 0.02 0.00 - 0.04 K/uL    Lymph # 1.1 1.0 - 4.8 K/uL    Mono " # 0.4 0.3 - 1.0 K/uL    Eos # 0.0 0.0 - 0.5 K/uL    Baso # 0.01 0.00 - 0.20 K/uL    nRBC 0 0 /100 WBC    Gran% 73.9 (H) 38.0 - 73.0 %    Lymph% 19.2 18.0 - 48.0 %    Mono% 6.4 4.0 - 15.0 %    Eosinophil% 0.0 0.0 - 8.0 %    Basophil% 0.2 0.0 - 1.9 %    Differential Method Automated    POCT Influenza A/B Molecular    Collection Time: 03/16/20  5:31 PM   Result Value Ref Range    POC Molecular Influenza A Ag Negative Negative, Not Reported    POC Molecular Influenza B Ag Negative Negative, Not Reported     Acceptable Yes    Comprehensive metabolic panel    Collection Time: 03/16/20  6:18 PM   Result Value Ref Range    Sodium 137 136 - 145 mmol/L    Potassium 3.7 3.5 - 5.1 mmol/L    Chloride 104 95 - 110 mmol/L    CO2 18 (L) 23 - 29 mmol/L    Glucose 98 70 - 110 mg/dL    BUN, Bld 20 6 - 20 mg/dL    Creatinine 1.1 0.5 - 1.4 mg/dL    Calcium 8.5 (L) 8.7 - 10.5 mg/dL    Total Protein 7.5 6.0 - 8.4 g/dL    Albumin 3.4 (L) 3.5 - 5.2 g/dL    Total Bilirubin 0.3 0.1 - 1.0 mg/dL    Alkaline Phosphatase 147 (H) 55 - 135 U/L    AST 68 (H) 10 - 40 U/L     (H) 10 - 44 U/L    Anion Gap 15 8 - 16 mmol/L    eGFR if African American >60 >60 mL/min/1.73 m^2    eGFR if non African American >60 >60 mL/min/1.73 m^2   Troponin I    Collection Time: 03/16/20  6:18 PM   Result Value Ref Range    Troponin I <0.006 0.000 - 0.026 ng/mL   Brain natriuretic peptide    Collection Time: 03/16/20  6:18 PM   Result Value Ref Range    BNP <10 0 - 99 pg/mL   Procalcitonin    Collection Time: 03/16/20  7:42 PM   Result Value Ref Range    Procalcitonin 0.33 (H) <0.25 ng/mL   C-reactive protein    Collection Time: 03/16/20  7:42 PM   Result Value Ref Range    CRP 76.8 (H) 0.0 - 8.2 mg/L       No results found for: INR, PROTIME  No results found for: HGBA1C  No results for input(s): POCTGLUCOSE in the last 72 hours.        IMAGING:     Imaging Results          X-Ray Chest 1 View (Final result)  Result time 03/16/20 16:54:01    Final  result by Ilya Riddle MD (03/16/20 16:54:01)                 Impression:      Interval bilateral diffuse nonspecific interstitial coarsening suggesting pulmonary edema versus infectious or inflammatory interstitial pneumonia.  No focal consolidation.      Electronically signed by: Ilya Riddle MD  Date:    03/16/2020  Time:    16:54             Narrative:    EXAMINATION:  XR CHEST 1 VIEW    CLINICAL HISTORY:  Shortness of breath    TECHNIQUE:  Single frontal view of the chest was performed.    COMPARISON:  Chest radiograph 03/11/2020    FINDINGS:  Monitoring leads overlie the chest.  Cardiomediastinal silhouette is midline and within normal limits for age.    Interval bilateral diffuse nonspecific interstitial coarsening, slightly more prominent on the left.  The lungs are otherwise symmetrically well expanded without focal consolidation, pleural effusion or pneumothorax.  Osseous structures are intact.                                    ASSESSMENT & PLAN:     Primary Diagnosis:  Acute hypoxemic respiratory failure    Active Hospital Problems    Diagnosis  POA    *Acute hypoxemic respiratory failure [J96.01]  Yes     Priority: 1 - High    Suspected Covid-19 Virus Infection [R68.89]  Yes     Priority: 2     Essential hypertension [I10]  Yes    Diabetes mellitus, type 2 [E11.9]  Yes    Hyperlipidemia [E78.5]  Yes    Febrile [R50.9]  Unknown    Transaminitis [R74.0]  Yes      Resolved Hospital Problems   No resolved problems to display.     Acute hypoxemic respiratory failure; Person Under Investigation for Suspected SARS-CoV-2 (COVID-19)   As evidenced by history, physical exam, xray imaging, and laboratory studies   COVID-19 sample:  Sent & Pending   Influenza negative:  Yes   TEM-PCR (respiratory viral panel):   Pending   HIV   CRP   Procalcitonin   IL-6   Ferritin   Maintain airborne isolation / droplet / contact precautions   Supplemental oxygen to maintain SpO2 >92%   Supportive  care   Limit all Visitors   Avoid BiPAP to minimize aerosolization; move to intubation and mechanical ventilation    If testing results positive for SARS-CoV-2 (COVID-19):   And if requiring mechanical ventilation, they will meet criteria for compassionate use Remdesivir. Please contact ID ASAP if intubated to complete this request.    Start hydroxychlorquine 400mg daily x 5 days  (In-vitro data and clinical experience in [place] indicates potential benefit).   Avoid steroids due to increased duration of viral replication; may consider corticosteroid use in refractory septic shock   Maintain euvolemic state due to risk of worsening hyoxemia and development of ARDS with volume overload    Febrile illness and transaminitis  · Likely secondary to viral etiology.  See above    Essential Hypertension  · Goal while inpatient is a systolic blood pressure less than 160mmHg  · BP in acceptable range at this time  · Continue current home regimen with hold parameters  · PRN antihypertensives available      Diabetes mellitus type 2  · BG in acceptable range at this time  · Maintain w/ subcutaneous insulin management order set  · Hold oral diabetic meds  · ADA 1800 kcal diet  · BG goal while in patient is <180mg/dL  · HgA1c = Pending    Hyperlipidemia   · Lipid panel - as an outpatient  · Cardiac diet  · Continue statin        VTE Risk Mitigation (From admission, onward)         Ordered     IP VTE LOW RISK PATIENT  Once      03/16/20 2346     Place ANNA hose  Until discontinued      03/16/20 2346     Place sequential compression device  Until discontinued      03/16/20 2346                  Adult PRN medications available   DVT prophylaxis given       DISPOSITION:     Will admit to the Hospital Medicine service for further evaluation and treatment.    Chart reviewed and updated where applicable.    High Risk Conditions:  Patient has a condition that poses threat to life and bodily function:  Acute hypoxemic respiratory  failure      ===============================================================    Smooth Martinez MD, MPH  Department of Hospital Medicine   [facility]  [phone] pg  (7pm - 6am)          This note is dictated using [employer] voice recognition software.  There are word recognition mistakes that are occasionally missed on review.  Claus that we had 2/20 2-year-old tonight.  That socks.

## 2020-03-17 NOTE — CARE UPDATE
Patient seen and examined.  Agree with Dr. Martinez's treatment and plan.  Probable COVID 19.  Await test results.  Supportive care.  Empirically started on ABx's.  Looking well.  Wean oxygen as possible.  Monitor for fevers.

## 2020-03-17 NOTE — PLAN OF CARE
Problem: Adult Inpatient Plan of Care  Goal: Absence of Hospital-Acquired Illness or Injury  Outcome: Ongoing, Progressing  Intervention: Identify and Manage Fall Risk  Flowsheets (Taken 3/17/2020 0541)  Safety Promotion/Fall Prevention: assistive device/personal item within reach; bed alarm set; side rails raised x 2     Problem: Fall Injury Risk  Goal: Absence of Fall and Fall-Related Injury  Intervention: Identify and Manage Contributors to Fall Injury Risk  Flowsheets (Taken 3/17/2020 0519)  Medication Review/Management: medications reviewed  Intervention: Promote Injury-Free Environment  Flowsheets (Taken 3/17/2020 0599)  Safety Promotion/Fall Prevention: assistive device/personal item within reach; bed alarm set

## 2020-03-17 NOTE — NURSING
Patient arrived to floor with fluids via PIV, transferred to bed. No acute distress noted at this time. O2 via nasal cannula.Will continue to monitor.

## 2020-03-18 PROBLEM — R50.9 FEBRILE: Status: RESOLVED | Noted: 2020-03-17 | Resolved: 2020-03-18

## 2020-03-18 LAB
ALBUMIN SERPL BCP-MCNC: 3.1 G/DL (ref 3.5–5.2)
ALP SERPL-CCNC: 158 U/L (ref 55–135)
ALT SERPL W/O P-5'-P-CCNC: 126 U/L (ref 10–44)
ANION GAP SERPL CALC-SCNC: 13 MMOL/L (ref 8–16)
AST SERPL-CCNC: 109 U/L (ref 10–40)
BASOPHILS # BLD AUTO: 0.01 K/UL (ref 0–0.2)
BASOPHILS NFR BLD: 0.3 % (ref 0–1.9)
BILIRUB SERPL-MCNC: 0.3 MG/DL (ref 0.1–1)
BUN SERPL-MCNC: 22 MG/DL (ref 6–20)
CALCIUM SERPL-MCNC: 8.4 MG/DL (ref 8.7–10.5)
CHLORIDE SERPL-SCNC: 103 MMOL/L (ref 95–110)
CO2 SERPL-SCNC: 21 MMOL/L (ref 23–29)
CREAT SERPL-MCNC: 1.3 MG/DL (ref 0.5–1.4)
DIFFERENTIAL METHOD: ABNORMAL
EOSINOPHIL # BLD AUTO: 0 K/UL (ref 0–0.5)
EOSINOPHIL NFR BLD: 0 % (ref 0–8)
ERYTHROCYTE [DISTWIDTH] IN BLOOD BY AUTOMATED COUNT: 13.4 % (ref 11.5–14.5)
EST. GFR  (AFRICAN AMERICAN): >60 ML/MIN/1.73 M^2
EST. GFR  (NON AFRICAN AMERICAN): 59 ML/MIN/1.73 M^2
ESTIMATED AVG GLUCOSE: 137 MG/DL (ref 68–131)
GLUCOSE SERPL-MCNC: 95 MG/DL (ref 70–110)
HBA1C MFR BLD HPLC: 6.4 % (ref 4–5.6)
HCT VFR BLD AUTO: 40.8 % (ref 40–54)
HGB BLD-MCNC: 13.8 G/DL (ref 14–18)
IL6 SERPL-MCNC: 16.8 PG/ML
IMM GRANULOCYTES # BLD AUTO: 0.01 K/UL (ref 0–0.04)
IMM GRANULOCYTES NFR BLD AUTO: 0.3 % (ref 0–0.5)
LYMPHOCYTES # BLD AUTO: 1 K/UL (ref 1–4.8)
LYMPHOCYTES NFR BLD: 26 % (ref 18–48)
MCH RBC QN AUTO: 28.6 PG (ref 27–31)
MCHC RBC AUTO-ENTMCNC: 33.8 G/DL (ref 32–36)
MCV RBC AUTO: 85 FL (ref 82–98)
MONOCYTES # BLD AUTO: 0.3 K/UL (ref 0.3–1)
MONOCYTES NFR BLD: 9 % (ref 4–15)
NEUTROPHILS # BLD AUTO: 2.4 K/UL (ref 1.8–7.7)
NEUTROPHILS NFR BLD: 64.4 % (ref 38–73)
NRBC BLD-RTO: 0 /100 WBC
PLATELET # BLD AUTO: 261 K/UL (ref 150–350)
PMV BLD AUTO: 10.6 FL (ref 9.2–12.9)
POCT GLUCOSE: 117 MG/DL (ref 70–110)
POCT GLUCOSE: 126 MG/DL (ref 70–110)
POCT GLUCOSE: 136 MG/DL (ref 70–110)
POTASSIUM SERPL-SCNC: 4 MMOL/L (ref 3.5–5.1)
PROT SERPL-MCNC: 7.5 G/DL (ref 6–8.4)
RBC # BLD AUTO: 4.83 M/UL (ref 4.6–6.2)
SODIUM SERPL-SCNC: 137 MMOL/L (ref 136–145)
WBC # BLD AUTO: 3.65 K/UL (ref 3.9–12.7)

## 2020-03-18 PROCEDURE — 63600175 PHARM REV CODE 636 W HCPCS: Performed by: HOSPITALIST

## 2020-03-18 PROCEDURE — 25000003 PHARM REV CODE 250: Performed by: HOSPITALIST

## 2020-03-18 PROCEDURE — 36415 COLL VENOUS BLD VENIPUNCTURE: CPT

## 2020-03-18 PROCEDURE — 80053 COMPREHEN METABOLIC PANEL: CPT

## 2020-03-18 PROCEDURE — 85025 COMPLETE CBC W/AUTO DIFF WBC: CPT

## 2020-03-18 PROCEDURE — 21400001 HC TELEMETRY ROOM

## 2020-03-18 RX ORDER — TALC
6 POWDER (GRAM) TOPICAL NIGHTLY PRN
Status: DISCONTINUED | OUTPATIENT
Start: 2020-03-18 | End: 2020-03-18

## 2020-03-18 RX ORDER — SODIUM CHLORIDE 9 MG/ML
INJECTION, SOLUTION INTRAVENOUS CONTINUOUS
Status: ACTIVE | OUTPATIENT
Start: 2020-03-18 | End: 2020-03-18

## 2020-03-18 RX ADMIN — GUAIFENESIN AND DEXTROMETHORPHAN 5 ML: 100; 10 SYRUP ORAL at 08:03

## 2020-03-18 RX ADMIN — ACETAMINOPHEN 650 MG: 325 TABLET ORAL at 06:03

## 2020-03-18 RX ADMIN — ACETAMINOPHEN 650 MG: 325 TABLET ORAL at 09:03

## 2020-03-18 RX ADMIN — CEFTRIAXONE 1 G: 1 INJECTION, SOLUTION INTRAVENOUS at 12:03

## 2020-03-18 RX ADMIN — SODIUM CHLORIDE: 0.9 INJECTION, SOLUTION INTRAVENOUS at 09:03

## 2020-03-18 RX ADMIN — AZITHROMYCIN MONOHYDRATE 500 MG: 500 INJECTION, POWDER, LYOPHILIZED, FOR SOLUTION INTRAVENOUS at 01:03

## 2020-03-18 RX ADMIN — CETIRIZINE HYDROCHLORIDE 10 MG: 10 TABLET, FILM COATED ORAL at 08:03

## 2020-03-18 NOTE — SUBJECTIVE & OBJECTIVE
Interval History: Not feeling well.  Poor appetite.    Review of Systems   HENT: Negative for ear discharge and ear pain.    Eyes: Negative for discharge and itching.   Endocrine: Negative for cold intolerance and heat intolerance.   Neurological: Negative for seizures and syncope.     Objective:     Vital Signs (Most Recent):  Temp: 98.3 °F (36.8 °C) (03/18/20 1204)  Pulse: 75 (03/18/20 1204)  Resp: 16 (03/18/20 1204)  BP: 117/66 (03/18/20 1204)  SpO2: 96 % (03/18/20 1204) Vital Signs (24h Range):  Temp:  [98.3 °F (36.8 °C)-100.8 °F (38.2 °C)] 98.3 °F (36.8 °C)  Pulse:  [69-76] 75  Resp:  [16-18] 16  SpO2:  [89 %-96 %] 96 %  BP: (101-118)/(59-66) 117/66     Weight: 72.9 kg (160 lb 11.5 oz)  Body mass index is 26.74 kg/m².    Intake/Output Summary (Last 24 hours) at 3/18/2020 1605  Last data filed at 3/18/2020 0830  Gross per 24 hour   Intake 320 ml   Output --   Net 320 ml      Physical Exam   Constitutional: He is oriented to person, place, and time. No distress.   HENT:   Head: Normocephalic and atraumatic.   Eyes: Conjunctivae are normal. Right eye exhibits no discharge. Left eye exhibits no discharge.   Neck: Neck supple. No tracheal deviation present.   Cardiovascular: Normal rate and regular rhythm.   Pulmonary/Chest: Effort normal.   Coarse BS bilateral bases   Abdominal: Soft. Bowel sounds are normal.   Musculoskeletal: He exhibits no edema or deformity.   Neurological: He is alert and oriented to person, place, and time.   Skin: Skin is warm and dry. He is not diaphoretic.       Significant Labs:   CBC:   Recent Labs   Lab 03/16/20  1700 03/18/20  0636   WBC 5.94 3.65*   HGB 14.8 13.8*   HCT 46.4 40.8    261     CMP:   Recent Labs   Lab 03/16/20  1818 03/18/20  0636    137   K 3.7 4.0    103   CO2 18* 21*   GLU 98 95   BUN 20 22*   CREATININE 1.1 1.3   CALCIUM 8.5* 8.4*   PROT 7.5 7.5   ALBUMIN 3.4* 3.1*   BILITOT 0.3 0.3   ALKPHOS 147* 158*   AST 68* 109*   * 126*   ANIONGAP 15  13   EGFRNONAA >60 59*       Significant Imaging: I have reviewed all pertinent imaging results/findings within the past 24 hours.

## 2020-03-18 NOTE — ASSESSMENT & PLAN NOTE
Patient presented with SOB, fever and hypoxia.  Concerning for COVID 19 infection.  Awaiting test results.  Empirically started on Rocephin and Zithromax.  Supportive care and supplemental oxygen.  Titrate down oxygen as possible.  Hemodynamically stable.  Continue current management and hopefully home soon.

## 2020-03-18 NOTE — PLAN OF CARE
Patient with suspected Covid-19; unable to reach by phone after multiple attempts; information obtained from medical records and charted assessement    Plan to attempt to contact patient to verify information and provide contact information    0 discharge needs identified at this time     03/18/20 0901   Discharge Assessment   Assessment Type Discharge Planning Assessment   Confirmed/corrected address and phone number on facesheet? No   Assessment information obtained from? Medical Record   Expected Length of Stay (days) 5   Communicated expected length of stay with patient/caregiver no   Prior to hospitilization cognitive status: Alert/Oriented   Prior to hospitalization functional status: Independent   Current cognitive status: Alert/Oriented   Current Functional Status: Independent   Facility Arrived From:   (Home)   Able to Return to Prior Arrangements yes   Is patient able to care for self after discharge? Yes   Who are your caregiver(s) and their phone number(s)? Shadia Carrera [phone]   Patient's perception of discharge disposition home or selfcare   Readmission Within the Last 30 Days no previous admission in last 30 days   Do you have any problems affording any of your prescribed medications? TBD   Discharge Plan A Home

## 2020-03-18 NOTE — PLAN OF CARE
Problem: Fall Injury Risk  Goal: Absence of Fall and Fall-Related Injury  Outcome: Ongoing, Progressing  Intervention: Identify and Manage Contributors to Fall Injury Risk  Flowsheets (Taken 3/18/2020 0632)  Self-Care Promotion: independence encouraged  Medication Review/Management: medications reviewed  Intervention: Promote Injury-Free Environment  Flowsheets (Taken 3/18/2020 0632)  Safety Promotion/Fall Prevention: assistive device/personal item within reach; side rails raised x 2  Environmental Safety Modification: assistive device/personal items within reach     Problem: Adult Inpatient Plan of Care  Goal: Patient-Specific Goal (Individualization)  Outcome: Ongoing, Progressing  Goal: Absence of Hospital-Acquired Illness or Injury  Outcome: Ongoing, Progressing  Intervention: Identify and Manage Fall Risk  Flowsheets (Taken 3/18/2020 0632)  Safety Promotion/Fall Prevention: assistive device/personal item within reach; side rails raised x 2  Intervention: Prevent VTE (venous thromboembolism)  Flowsheets (Taken 3/18/2020 0632)  VTE Prevention/Management: bleeding risk assessed  Goal: Optimal Comfort and Wellbeing  Outcome: Ongoing, Progressing  Intervention: Provide Person-Centered Care  Flowsheets (Taken 3/18/2020 0632)  Trust Relationship/Rapport: care explained; questions answered

## 2020-03-18 NOTE — HOSPITAL COURSE
60 y/o male presents with shortness of breath, fever and chills.  Febrile and hypoxic on presentation.  Concern about possible COVID 19 infection.  Empirically started on Rocephin and Zithromax.  Influenza negative.  Supportive care and oxygen.  Patient continued with fevers, but hemodynamically stable during hospital stay.  His symptoms have improved.  Probable COVID 19 infection as no other source of infection.  Test still pending, but patient ready for discharge.  Patient will be given COVID 19 discharge instruction.  Follow up with PCP has been arranged.  He is currently afebrile with no hypoxia.

## 2020-03-19 LAB
ALBUMIN SERPL BCP-MCNC: 2.8 G/DL (ref 3.5–5.2)
ALP SERPL-CCNC: 152 U/L (ref 55–135)
ALT SERPL W/O P-5'-P-CCNC: 155 U/L (ref 10–44)
ANION GAP SERPL CALC-SCNC: 12 MMOL/L (ref 8–16)
AST SERPL-CCNC: 135 U/L (ref 10–40)
BILIRUB SERPL-MCNC: 0.3 MG/DL (ref 0.1–1)
BUN SERPL-MCNC: 15 MG/DL (ref 6–20)
CALCIUM SERPL-MCNC: 8.1 MG/DL (ref 8.7–10.5)
CHLORIDE SERPL-SCNC: 105 MMOL/L (ref 95–110)
CO2 SERPL-SCNC: 20 MMOL/L (ref 23–29)
CREAT SERPL-MCNC: 0.9 MG/DL (ref 0.5–1.4)
EST. GFR  (AFRICAN AMERICAN): >60 ML/MIN/1.73 M^2
EST. GFR  (NON AFRICAN AMERICAN): >60 ML/MIN/1.73 M^2
GLUCOSE SERPL-MCNC: 88 MG/DL (ref 70–110)
POCT GLUCOSE: 118 MG/DL (ref 70–110)
POCT GLUCOSE: 154 MG/DL (ref 70–110)
POCT GLUCOSE: 88 MG/DL (ref 70–110)
POTASSIUM SERPL-SCNC: 3.9 MMOL/L (ref 3.5–5.1)
PROT SERPL-MCNC: 7.2 G/DL (ref 6–8.4)
SODIUM SERPL-SCNC: 137 MMOL/L (ref 136–145)

## 2020-03-19 PROCEDURE — 80053 COMPREHEN METABOLIC PANEL: CPT

## 2020-03-19 PROCEDURE — 36415 COLL VENOUS BLD VENIPUNCTURE: CPT

## 2020-03-19 PROCEDURE — 21400001 HC TELEMETRY ROOM

## 2020-03-19 PROCEDURE — 63600175 PHARM REV CODE 636 W HCPCS: Performed by: HOSPITALIST

## 2020-03-19 PROCEDURE — 25000003 PHARM REV CODE 250: Performed by: HOSPITALIST

## 2020-03-19 RX ADMIN — ACETAMINOPHEN 650 MG: 325 TABLET ORAL at 08:03

## 2020-03-19 RX ADMIN — AZITHROMYCIN MONOHYDRATE 500 MG: 500 INJECTION, POWDER, LYOPHILIZED, FOR SOLUTION INTRAVENOUS at 01:03

## 2020-03-19 RX ADMIN — GUAIFENESIN AND DEXTROMETHORPHAN 5 ML: 100; 10 SYRUP ORAL at 08:03

## 2020-03-19 RX ADMIN — CEFTRIAXONE 1 G: 1 INJECTION, SOLUTION INTRAVENOUS at 12:03

## 2020-03-19 RX ADMIN — BENZONATATE 100 MG: 100 CAPSULE ORAL at 08:03

## 2020-03-19 RX ADMIN — ACETAMINOPHEN 650 MG: 325 TABLET ORAL at 06:03

## 2020-03-19 RX ADMIN — CETIRIZINE HYDROCHLORIDE 10 MG: 10 TABLET, FILM COATED ORAL at 08:03

## 2020-03-19 NOTE — SUBJECTIVE & OBJECTIVE
Interval History: Feeling better.    Review of Systems   HENT: Negative for ear discharge and ear pain.    Eyes: Negative for discharge and itching.   Endocrine: Negative for cold intolerance and heat intolerance.   Neurological: Negative for seizures and syncope.     Objective:     Vital Signs (Most Recent):  Temp: 98.1 °F (36.7 °C) (03/19/20 1146)  Pulse: 67 (03/19/20 1146)  Resp: 18 (03/19/20 1146)  BP: 114/66 (03/19/20 1146)  SpO2: 95 % (03/19/20 1146) Vital Signs (24h Range):  Temp:  [98.1 °F (36.7 °C)-102.3 °F (39.1 °C)] 98.1 °F (36.7 °C)  Pulse:  [67-81] 67  Resp:  [14-19] 18  SpO2:  [93 %-96 %] 95 %  BP: (106-132)/(57-71) 114/66     Weight: 72.9 kg (160 lb 11.5 oz)  Body mass index is 26.74 kg/m².    Intake/Output Summary (Last 24 hours) at 3/19/2020 1545  Last data filed at 3/19/2020 0457  Gross per 24 hour   Intake 1574.17 ml   Output --   Net 1574.17 ml      Physical Exam   Constitutional: He is oriented to person, place, and time. No distress.   HENT:   Head: Normocephalic and atraumatic.   Eyes: Conjunctivae are normal. Right eye exhibits no discharge. Left eye exhibits no discharge.   Neck: Neck supple. No tracheal deviation present.   Cardiovascular: Normal rate and regular rhythm.   Pulmonary/Chest: Effort normal.   Coarse BS bilateral bases   Abdominal: Soft. Bowel sounds are normal.   Musculoskeletal: He exhibits no edema or deformity.   Neurological: He is alert and oriented to person, place, and time.   Skin: Skin is warm and dry. He is not diaphoretic.       Significant Labs:   CBC:   Recent Labs   Lab 03/18/20  0636   WBC 3.65*   HGB 13.8*   HCT 40.8        CMP:   Recent Labs   Lab 03/18/20  0636 03/19/20  0509    137   K 4.0 3.9    105   CO2 21* 20*   GLU 95 88   BUN 22* 15   CREATININE 1.3 0.9   CALCIUM 8.4* 8.1*   PROT 7.5 7.2   ALBUMIN 3.1* 2.8*   BILITOT 0.3 0.3   ALKPHOS 158* 152*   * 135*   * 155*   ANIONGAP 13 12   EGFRNONAA 59* >60       Significant  Imaging: I have reviewed all pertinent imaging results/findings within the past 24 hours.

## 2020-03-19 NOTE — PROGRESS NOTES
[facility] Hospital Medicine  Progress Note    Patient Name: Juice Sutherland  MRN: [id number]  Patient Class: IP- Inpatient   Admission Date: 3/16/2020  Length of Stay: 3 days  Attending Physician: Philip Castoerna MD  Primary Care Provider: Starla Parada MD        Subjective:     Principal Problem:Acute hypoxemic respiratory failure        HPI:    Juice Sutherland is a 59 y.o. male that (in part)  has a past medical history of Diabetes mellitus, Hyperlipemia, and Hypertension.  has a past surgical history that includes Fracture surgery. Presents to [facility] Emergency Department shortness of breath associated with fever, chills, and cough.  Began 8 days ago with progressive worsening.  Was initially evaluated in the emergency department, tested negative for the flu, but was not hypoxemic therefore discharged to home.  Re-presented night with similar worsening symptoms.  Was not hypoxemic at rest, but oxygen levels desaturated to 87% with ambulation.  Denies recent travel, sick contacts, cruise trips, or use of [employer]/lift services.      In the emergency department routine laboratory studies, influenza testing, cardiac enzymes, and chest x-ray was obtained.  Chest x-ray concerning for bilateral diffuse nonspecific interstitial coarsening suggested of pulmonary edema versus infectious or inflammatory interstitial pneumonia.  No focal consolidations were identified.  Findings were suggestive of COVID-19 given the negative influenza testing, fever, transaminitis, low normal procalcitonin, and markedly elevated CRP levels.    Hospital medicine has been asked to admit to inpatient for further evaluation and treatment for acute hypoxemic respiratory failure and suspected COVID-19.     Overview/Hospital Course:  58 y/o male presents with shortness of breath, fever and chills.  Febrile and hypoxic on presentation.  Concern about possible COVID 19 infection.  Empirically started on  Rocephin and Zithromax.  Influenza negative.  Supportive care and oxygen.    Interval History: Feeling better.    Review of Systems   HENT: Negative for ear discharge and ear pain.    Eyes: Negative for discharge and itching.   Endocrine: Negative for cold intolerance and heat intolerance.   Neurological: Negative for seizures and syncope.     Objective:     Vital Signs (Most Recent):  Temp: 98.1 °F (36.7 °C) (03/19/20 1146)  Pulse: 67 (03/19/20 1146)  Resp: 18 (03/19/20 1146)  BP: 114/66 (03/19/20 1146)  SpO2: 95 % (03/19/20 1146) Vital Signs (24h Range):  Temp:  [98.1 °F (36.7 °C)-102.3 °F (39.1 °C)] 98.1 °F (36.7 °C)  Pulse:  [67-81] 67  Resp:  [14-19] 18  SpO2:  [93 %-96 %] 95 %  BP: (106-132)/(57-71) 114/66     Weight: 72.9 kg (160 lb 11.5 oz)  Body mass index is 26.74 kg/m².    Intake/Output Summary (Last 24 hours) at 3/19/2020 1545  Last data filed at 3/19/2020 0457  Gross per 24 hour   Intake 1574.17 ml   Output --   Net 1574.17 ml      Physical Exam   Constitutional: He is oriented to person, place, and time. No distress.   HENT:   Head: Normocephalic and atraumatic.   Eyes: Conjunctivae are normal. Right eye exhibits no discharge. Left eye exhibits no discharge.   Neck: Neck supple. No tracheal deviation present.   Cardiovascular: Normal rate and regular rhythm.   Pulmonary/Chest: Effort normal.   Coarse BS bilateral bases   Abdominal: Soft. Bowel sounds are normal.   Musculoskeletal: He exhibits no edema or deformity.   Neurological: He is alert and oriented to person, place, and time.   Skin: Skin is warm and dry. He is not diaphoretic.       Significant Labs:   CBC:   Recent Labs   Lab 03/18/20  0636   WBC 3.65*   HGB 13.8*   HCT 40.8        CMP:   Recent Labs   Lab 03/18/20  0636 03/19/20  0509    137   K 4.0 3.9    105   CO2 21* 20*   GLU 95 88   BUN 22* 15   CREATININE 1.3 0.9   CALCIUM 8.4* 8.1*   PROT 7.5 7.2   ALBUMIN 3.1* 2.8*   BILITOT 0.3 0.3   ALKPHOS 158* 152*   *  135*   * 155*   ANIONGAP 13 12   EGFRNONAA 59* >60       Significant Imaging: I have reviewed all pertinent imaging results/findings within the past 24 hours.      Assessment/Plan:      * Acute hypoxemic respiratory failure  Patient presented with SOB, fever and hypoxia.  Concerning for COVID 19 infection.  Awaiting test results.  Empirically started on Rocephin and Zithromax.  Supportive care and supplemental oxygen.  Titrate down oxygen as possible.  Hemodynamically stable.  Continue current management and hopefully home soon.    Transaminitis  Probably secondary to viral illness.      Hyperlipidemia        Diabetes mellitus, type 2  Well controlled.  Poor oral intake and does not like food.  Ok for regular diet.      Essential hypertension  Continue Losartan.      Suspected Covid-19 Virus Infection  As above.        VTE Risk Mitigation (From admission, onward)         Ordered     IP VTE LOW RISK PATIENT  Once      03/16/20 2346     Place ANNA hose  Until discontinued      03/16/20 2346     Place sequential compression device  Until discontinued      03/16/20 2346                      Philip Castorena MD  Department of Hospital Medicine   [facility]

## 2020-03-20 PROCEDURE — 63600175 PHARM REV CODE 636 W HCPCS: Performed by: HOSPITALIST

## 2020-03-20 PROCEDURE — 21400001 HC TELEMETRY ROOM

## 2020-03-20 PROCEDURE — 25000003 PHARM REV CODE 250: Performed by: HOSPITALIST

## 2020-03-20 RX ADMIN — BENZONATATE 100 MG: 100 CAPSULE ORAL at 04:03

## 2020-03-20 RX ADMIN — BENZONATATE 100 MG: 100 CAPSULE ORAL at 12:03

## 2020-03-20 RX ADMIN — LOSARTAN POTASSIUM 50 MG: 25 TABLET ORAL at 09:03

## 2020-03-20 RX ADMIN — GUAIFENESIN AND DEXTROMETHORPHAN 5 ML: 100; 10 SYRUP ORAL at 09:03

## 2020-03-20 RX ADMIN — GUAIFENESIN AND DEXTROMETHORPHAN 5 ML: 100; 10 SYRUP ORAL at 08:03

## 2020-03-20 RX ADMIN — ACETAMINOPHEN 650 MG: 325 TABLET ORAL at 09:03

## 2020-03-20 RX ADMIN — ACETAMINOPHEN 650 MG: 325 TABLET ORAL at 12:03

## 2020-03-20 RX ADMIN — CEFTRIAXONE 1 G: 1 INJECTION, SOLUTION INTRAVENOUS at 12:03

## 2020-03-20 RX ADMIN — ACETAMINOPHEN 650 MG: 325 TABLET ORAL at 04:03

## 2020-03-20 RX ADMIN — AZITHROMYCIN MONOHYDRATE 500 MG: 500 INJECTION, POWDER, LYOPHILIZED, FOR SOLUTION INTRAVENOUS at 12:03

## 2020-03-20 RX ADMIN — CETIRIZINE HYDROCHLORIDE 10 MG: 10 TABLET, FILM COATED ORAL at 09:03

## 2020-03-20 NOTE — PROGRESS NOTES
[facility] Hospital Medicine  Progress Note    Patient Name: Juice Sutherland  MRN: [id number]  Patient Class: IP- Inpatient   Admission Date: 3/16/2020  Length of Stay: 4 days  Attending Physician: Philip Castorena MD  Primary Care Provider: Bekah Squires MD        Subjective:     Principal Problem:Acute hypoxemic respiratory failure        HPI:    Juice Sutherland is a 59 y.o. male that (in part)  has a past medical history of Diabetes mellitus, Hyperlipemia, and Hypertension.  has a past surgical history that includes Fracture surgery. Presents to [facility] Emergency Department shortness of breath associated with fever, chills, and cough.  Began 8 days ago with progressive worsening.  Was initially evaluated in the emergency department, tested negative for the flu, but was not hypoxemic therefore discharged to home.  Re-presented night with similar worsening symptoms.  Was not hypoxemic at rest, but oxygen levels desaturated to 87% with ambulation.  Denies recent travel, sick contacts, cruise trips, or use of [employer]/lift services.      In the emergency department routine laboratory studies, influenza testing, cardiac enzymes, and chest x-ray was obtained.  Chest x-ray concerning for bilateral diffuse nonspecific interstitial coarsening suggested of pulmonary edema versus infectious or inflammatory interstitial pneumonia.  No focal consolidations were identified.  Findings were suggestive of COVID-19 given the negative influenza testing, fever, transaminitis, low normal procalcitonin, and markedly elevated CRP levels.    Hospital medicine has been asked to admit to inpatient for further evaluation and treatment for acute hypoxemic respiratory failure and suspected COVID-19.     Overview/Hospital Course:  58 y/o male presents with shortness of breath, fever and chills.  Febrile and hypoxic on presentation.  Concern about possible COVID 19 infection.  Empirically started on  Rocephin and Zithromax.  Influenza negative.  Supportive care and oxygen.    Interval History: Feeling better, but asked nurse to increase oxygen.    Review of Systems   HENT: Negative for ear discharge and ear pain.    Eyes: Negative for discharge and itching.   Endocrine: Negative for cold intolerance and heat intolerance.   Neurological: Negative for seizures and syncope.     Objective:     Vital Signs (Most Recent):  Temp: 98.3 °F (36.8 °C) (03/20/20 1212)  Pulse: 67 (03/20/20 1212)  Resp: 18 (03/20/20 1212)  BP: 107/64 (03/20/20 1212)  SpO2: 96 % (03/20/20 1212) Vital Signs (24h Range):  Temp:  [98 °F (36.7 °C)-99 °F (37.2 °C)] 98.3 °F (36.8 °C)  Pulse:  [60-73] 67  Resp:  [18-20] 18  SpO2:  [93 %-97 %] 96 %  BP: (105-120)/(57-69) 107/64     Weight: 74.2 kg (163 lb 9.3 oz)  Body mass index is 27.22 kg/m².    Intake/Output Summary (Last 24 hours) at 3/20/2020 1554  Last data filed at 3/20/2020 0900  Gross per 24 hour   Intake 180 ml   Output 400 ml   Net -220 ml      Physical Exam   Constitutional: He is oriented to person, place, and time. No distress.   HENT:   Head: Normocephalic and atraumatic.   Eyes: Conjunctivae are normal. Right eye exhibits no discharge. Left eye exhibits no discharge.   Neck: Neck supple. No tracheal deviation present.   Cardiovascular: Normal rate and regular rhythm.   Pulmonary/Chest: Effort normal.   Coarse BS bilateral bases   Abdominal: Soft. Bowel sounds are normal.   Musculoskeletal: He exhibits no edema or deformity.   Neurological: He is alert and oriented to person, place, and time.   Skin: Skin is warm and dry. He is not diaphoretic.       Significant Labs:   CBC:   No results for input(s): WBC, HGB, HCT, PLT in the last 48 hours.  CMP:   Recent Labs   Lab 03/19/20  0509      K 3.9      CO2 20*   GLU 88   BUN 15   CREATININE 0.9   CALCIUM 8.1*   PROT 7.2   ALBUMIN 2.8*   BILITOT 0.3   ALKPHOS 152*   *   *   ANIONGAP 12   EGFRNONAA >60        Significant Imaging: I have reviewed all pertinent imaging results/findings within the past 24 hours.      Assessment/Plan:      * Acute hypoxemic respiratory failure  Patient presented with SOB, fever and hypoxia.  Concerning for COVID 19 infection.  Awaiting test results.  Empirically started on Rocephin and Zithromax.  Supportive care and supplemental oxygen.  Titrate down oxygen as possible.  Hemodynamically stable.  Continue current management and hopefully home soon.    Transaminitis  Probably secondary to viral illness.      Hyperlipidemia        Diabetes mellitus, type 2  Well controlled.  Poor oral intake and does not like food.  Ok for regular diet.      Essential hypertension  Continue Losartan.      Suspected Covid-19 Virus Infection  As above.        VTE Risk Mitigation (From admission, onward)         Ordered     IP VTE LOW RISK PATIENT  Once      03/16/20 2346     Place ANNA hose  Until discontinued      03/16/20 2346     Place sequential compression device  Until discontinued      03/16/20 2346                      Philip Castorena MD  Department of Hospital Medicine   [facility]

## 2020-03-20 NOTE — PLAN OF CARE
Problem: Fall Injury Risk  Goal: Absence of Fall and Fall-Related Injury  Outcome: Ongoing, Progressing     Problem: Adult Inpatient Plan of Care  Goal: Plan of Care Review  Outcome: Ongoing, Progressing  Goal: Patient-Specific Goal (Individualization)  Outcome: Ongoing, Progressing  Goal: Absence of Hospital-Acquired Illness or Injury  Outcome: Ongoing, Progressing  Goal: Optimal Comfort and Wellbeing  Outcome: Ongoing, Progressing  Goal: Readiness for Transition of Care  Outcome: Ongoing, Progressing     Problem: Breathing Pattern Ineffective  Goal: Effective Breathing Pattern  Outcome: Ongoing, Progressing  Intervention: Promote Improved Breathing Pattern  Flowsheets (Taken 3/20/2020 4511)  Airway/Ventilation Management: airway patency maintained; humidification applied; pulmonary hygiene promoted  Breathing Techniques/Airway Clearance: deep/controlled cough encouraged; pursed-lip breathing encouraged  Supportive Measures: positive reinforcement provided; relaxation techniques promoted; verbalization of feelings encouraged  Head of Bed (HOB): HOB at 45 degrees   No falls or any skin injury noted. No fever,  diarrhea this shift. Report of shortness of breath on exertions. Tylenol given once for generalized pain. No further complaints made. Oxygen at 4 L well tolerated.

## 2020-03-20 NOTE — PLAN OF CARE
Problem: Fever  Goal: Body Temperature in Desired Range  Outcome: Ongoing, Progressing  Intervention: Promote Normothermia  Flowsheets (Taken 3/19/2020 1934)  Thermoregulation Maintenance: chilled fluids administered; lightweight clothing/blankets used; room temperature decreased     Problem: Infection  Goal: Infection Symptom Resolution  Outcome: Ongoing, Progressing  Intervention: Prevent or Manage Infection  Flowsheets (Taken 3/19/2020 1934)  Fever Reduction/Comfort Measures: lightweight bedding; medication administered  Infection Management: aseptic technique maintained  Isolation Precautions: airborne precautions maintained; contact precautions maintained; droplet precautions maintained; protective environment maintained   Temp under 100 today, one episode loose stools, appetite improving (son brought food delivered to him per pt request).  O2 nc continues 3L, some intermittent SOB and coughing.

## 2020-03-20 NOTE — NURSING
Report received from MARÍA ELENA Auguste. Patient resting comfortably in bed, no acute distress noted.He is on oxygen at 5L via nasal cannula, no SOB at this time. Plan of care reviewed with patient. Instructed patient to call for assistance before ambulating, side rails up x2, bed alarm set, call light in reach, non skid socks in use. Peripheral IV site, no redness or swelling noted.  Patient verbalized understanding of instructions. Will continue to monitor.

## 2020-03-20 NOTE — SUBJECTIVE & OBJECTIVE
Interval History: Feeling better, but asked nurse to increase oxygen.    Review of Systems   HENT: Negative for ear discharge and ear pain.    Eyes: Negative for discharge and itching.   Endocrine: Negative for cold intolerance and heat intolerance.   Neurological: Negative for seizures and syncope.     Objective:     Vital Signs (Most Recent):  Temp: 98.3 °F (36.8 °C) (03/20/20 1212)  Pulse: 67 (03/20/20 1212)  Resp: 18 (03/20/20 1212)  BP: 107/64 (03/20/20 1212)  SpO2: 96 % (03/20/20 1212) Vital Signs (24h Range):  Temp:  [98 °F (36.7 °C)-99 °F (37.2 °C)] 98.3 °F (36.8 °C)  Pulse:  [60-73] 67  Resp:  [18-20] 18  SpO2:  [93 %-97 %] 96 %  BP: (105-120)/(57-69) 107/64     Weight: 74.2 kg (163 lb 9.3 oz)  Body mass index is 27.22 kg/m².    Intake/Output Summary (Last 24 hours) at 3/20/2020 1554  Last data filed at 3/20/2020 0900  Gross per 24 hour   Intake 180 ml   Output 400 ml   Net -220 ml      Physical Exam   Constitutional: He is oriented to person, place, and time. No distress.   HENT:   Head: Normocephalic and atraumatic.   Eyes: Conjunctivae are normal. Right eye exhibits no discharge. Left eye exhibits no discharge.   Neck: Neck supple. No tracheal deviation present.   Cardiovascular: Normal rate and regular rhythm.   Pulmonary/Chest: Effort normal.   Coarse BS bilateral bases   Abdominal: Soft. Bowel sounds are normal.   Musculoskeletal: He exhibits no edema or deformity.   Neurological: He is alert and oriented to person, place, and time.   Skin: Skin is warm and dry. He is not diaphoretic.       Significant Labs:   CBC:   No results for input(s): WBC, HGB, HCT, PLT in the last 48 hours.  CMP:   Recent Labs   Lab 03/19/20  0509      K 3.9      CO2 20*   GLU 88   BUN 15   CREATININE 0.9   CALCIUM 8.1*   PROT 7.2   ALBUMIN 2.8*   BILITOT 0.3   ALKPHOS 152*   *   *   ANIONGAP 12   EGFRNONAA >60       Significant Imaging: I have reviewed all pertinent imaging results/findings within  the past 24 hours.

## 2020-03-20 NOTE — PLAN OF CARE
TN was able to interview patient via telephone to discuss discharge planning and home needs; pt confirmed address/number on face sheet is correct; PCP updated to Dr Squires. Stated that he has Nichole Anaya , friend , for help at home; 0 home equipment in use and does not anticipate any at this time; denies further discharge or home needs at this time    Explained TN role to patient; name and means of contact given to patient and encouraged to call for any questions or discharge needs; verbalized understanding       03/20/20 1204   Discharge Reassessment   Assessment Type Discharge Planning Reassessment   Provided patient/caregiver education on the expected discharge date and the discharge plan Yes   Do you have any problems affording any of your prescribed medications? No   Discharge Plan A Home   Discharge Plan B Home   DME Needed Upon Discharge  none   Patient choice form signed by patient/caregiver N/A   Anticipated Discharge Disposition Home   Can the patient/caregiver answer the patient profile reliably? Yes, cognitively intact   How does the patient rate their overall health at the present time? Good   Describe the patient's ability to walk at the present time. No restrictions   How often would a person be available to care for the patient? Often   Post-Acute Status   Post-Acute Authorization Other   Other Status No Post-Acute Service Needs   Discharge Delays None known at this time       [facility] [address] [facility] [place] - [address]  [address] [place] [address]  Phone: [phone] Fax: [phone]

## 2020-03-21 VITALS
HEIGHT: 65 IN | DIASTOLIC BLOOD PRESSURE: 74 MMHG | WEIGHT: 163.56 LBS | OXYGEN SATURATION: 96 % | SYSTOLIC BLOOD PRESSURE: 112 MMHG | HEART RATE: 63 BPM | BODY MASS INDEX: 27.25 KG/M2 | RESPIRATION RATE: 18 BRPM | TEMPERATURE: 98 F

## 2020-03-21 PROBLEM — J96.01 ACUTE HYPOXEMIC RESPIRATORY FAILURE: Status: RESOLVED | Noted: 2020-03-17 | Resolved: 2020-03-21

## 2020-03-21 PROCEDURE — 25000003 PHARM REV CODE 250: Performed by: HOSPITALIST

## 2020-03-21 RX ORDER — ACETAMINOPHEN 325 MG/1
650 TABLET ORAL EVERY 4 HOURS PRN
Qty: 30 TABLET | Refills: 0 | Status: SHIPPED | OUTPATIENT
Start: 2020-03-21

## 2020-03-21 RX ADMIN — GUAIFENESIN AND DEXTROMETHORPHAN 5 ML: 100; 10 SYRUP ORAL at 09:03

## 2020-03-21 RX ADMIN — ACETAMINOPHEN 650 MG: 325 TABLET ORAL at 03:03

## 2020-03-21 RX ADMIN — LOSARTAN POTASSIUM 50 MG: 25 TABLET ORAL at 09:03

## 2020-03-21 RX ADMIN — ACETAMINOPHEN 650 MG: 325 TABLET ORAL at 09:03

## 2020-03-21 RX ADMIN — BENZONATATE 100 MG: 100 CAPSULE ORAL at 12:03

## 2020-03-21 RX ADMIN — CETIRIZINE HYDROCHLORIDE 10 MG: 10 TABLET, FILM COATED ORAL at 09:03

## 2020-03-21 NOTE — PLAN OF CARE
03/21/20 1207   Final Note   Assessment Type Final Discharge Note   Anticipated Discharge Disposition Home   What phone number can be called within the next 1-3 days to see how you are doing after discharge?   ([phone] )   Hospital Follow Up  Appt(s) scheduled? Yes   Discharge plans and expectations educations in teach back method with documentation complete? Yes   Right Care Referral Info   Post Acute Recommendation No Care   Post-Acute Status   Post-Acute Authorization Other  (Home with follow-up)   Other Status No Post-Acute Service Needs   Discharge Delays None known at this time

## 2020-03-21 NOTE — PROGRESS NOTES
Patient to discharge with Home O2; contacted Gerardo Gross with [facility] to set up O2; Gerardo approved portable tank to be pulled from DME closet; he will set home delivery of concentrator. Portable and pulsox pulled from closet and delivered to patient's nurse

## 2020-03-21 NOTE — PLAN OF CARE
Reviewed Plan of care with patient. Patient verbalized understanding. Patient is on Airborne/Contact/Droplet precautions.  No c/o pain or discomfort or resp distress.  VSS. Safety measures in place.  No c/o falls or injuries.  Patient has discharge orders.  Clinic follow up, medication regimen, s/s return to ER.R/O Covic-19 pending. Explained to patient and gave literature to protect self and family from virus. Patient verbalized understanding.

## 2020-03-21 NOTE — NURSING
Weaned patient off from oxygen    O2 decreased to 4 L able to maintained Oxygen sats of 93-94% at rest.    12:00am decreased to 3L - 93% at rest, no report of SOB this time.

## 2020-03-21 NOTE — PLAN OF CARE
03/21/20 1545   Post-Acute Status   Post-Acute Authorization HME  (Home O2)   HME Status Set-up Complete   Other Status See Comments  (Home O2-[facility])   Discharge Delays None known at this time   Discharge Plan   Discharge Plan A Home with family  (Home O[facility])   Discharge Plan B Home with family  (Home O[facility])

## 2020-03-21 NOTE — DISCHARGE INSTRUCTIONS
[place] [facility]  Preventing the Spread of Coronavirus Disease 2019 in Homes and Residential Communities      Prevention steps for people with confirmed or suspected COVID-19 (including persons under investigation) who do not need to be hospitalized and people with confirmed COVID-19 who were hospitalized and determined to be medically stable to go home.    Your healthcare provider and public health staff will evaluate whether you can be cared for at home.   Stay home except to get medical care.   Separate yourself from other people and animals in your home   Call ahead before visiting your doctor.   Wear a facemask.   Cover your coughs and sneezes.   Clean your hands often.   Avoid sharing personal household items.   Clean all high-touch surfaces every day.   Monitor your symptoms. Seek prompt medical attention if your illness is worsening (e.g., difficulty breathing). Before seeking care, call your healthcare provider.   If you have a medical emergency and need to call 911, notify the dispatch personnel that you have, or are being evaluated for COVID-19. If possible, put on a facemask before emergency medical services arrive.   Discontinuing home isolation. Call your provider about guidance to discontinue home isolation.    Recommended precautions for household members, intimate partners, and caregivers in a nonhealthcare setting of a patient with symptomatic laboratory-confirmed COVID-19 or a patient under investigation.  Household members, intimate partners, and caregivers in a nonhealthcare setting may have close contact with a person with symptomatic, laboratory-confirmed COVID-19 or a person under investigation. Close contacts should monitor their health; they should call their healthcare provider right away if they develop symptoms suggestive of COVID-19 (e.g., fever, cough, shortness of breath).    Close contacts should also follow these recommendations:   Make sure  that you understand and can help the patient follow their healthcare provider's instructions for medication(s) and care. You should help the patient with basic needs in the home and provide support for getting groceries, prescriptions, and other personal needs.   Monitor the patient's symptoms. If the patient is getting sicker, call his or her healthcare provider and tell them that the patient has laboratory-confirmed COVID-19. This will help the healthcare provider's office take steps to keep other people in the office or waiting room from getting infected. Ask the healthcare provider to call the local or [facility] health department for additional guidance. If the patient has a medical emergency and you need to call 911, notify the dispatch personnel that the patient has, or is being evaluated for COVID-19.   Household members should stay in another room or be  from the patient as much as possible. Household members should use a separate bedroom and bathroom, if available.   Prohibit visitors who do not have an essential need to be in the home.   Household members should care for any pets in the home. Do not handle pets or other animals while sick.   Make sure that shared spaces in the home have good air flow, such as by an air conditioner or an opened window, weather permitting.   Perform hand hygiene frequently. Wash your hands often with soap and water for at least 20 seconds or use an alcohol-based hand  that contains 60 to 95% alcohol, covering all surfaces of your hands and rubbing them together until they feel dry. Soap and water should be used preferentially if hands are visibly dirty.   Avoid touching your eyes, nose, and mouth with unwashed hands.   The patient should wear a facemask when you are around other people. If the patient is not able to wear a facemask (for example, because it causes trouble breathing), you, as the caregiver should wear a mask when you are in the same room  as the patient.   Wear a disposable facemask and gloves when you touch or have contact with the patient's blood, stool, or body fluids, such as saliva, sputum, nasal mucus, vomit, urine.  o Throw out disposable facemasks and gloves after using them. Do not reuse.  o When removing personal protective equipment, first remove and dispose of gloves. Then, immediately clean your hands with soap and water or alcohol-based hand . Next, remove and dispose of facemask, and immediately clean your hands again with soap and water or alcohol-based hand .   Avoid sharing household items with the patient. You should not share dishes, drinking glasses, cups, eating utensils, towels, bedding, or other items. After the patient uses these items, you should wash them thoroughly (see below Wash laundry thoroughly).   Clean all high-touch surfaces, such as counters, tabletops, doorknobs, bathroom fixtures, toilets, phones, keyboards, tablets, and bedside tables, every day. Also, clean any surfaces that may have blood, stool, or body fluids on them.   Use a household cleaning spray or wipe, according to the label instructions. Labels contain instructions for safe and effective use of the cleaning product including precautions you should take when applying the product, such as wearing gloves and making sure you have good ventilation during use of the product.   Wash laundry thoroughly.  o Immediately remove and wash clothes or bedding that have blood, stool, or body fluids on them.  o Wear disposable gloves while handling soiled items and keep soiled items away from your body. Clean your hands (with soap and water or an alcohol-based hand ) immediately after removing your gloves.  o Read and follow directions on labels of laundry or clothing items and detergent. In general, using a normal laundry detergent according to washing machine instructions and dry thoroughly using the warmest temperatures  recommended on the clothing label.   Place all used disposable gloves, facemasks, and other contaminated items in a lined container before disposing of them with other household waste. Clean your hands (with soap and water or an alcohol-based hand ) immediately after handling these items. Soap and water should be used preferentially if hands are visibly dirty.   Discuss any additional questions with your state or local health department or healthcare provider. Check available hours when contacting your local health department.    For more information see CDC link below.      https://www.cdc.gov/coronavirus/2019-ncov/hcp/guidance-prevent-spread.html#precautions

## 2020-03-21 NOTE — DISCHARGE SUMMARY
[facility] Hospital Medicine  Discharge Summary      Patient Name: Juice Sutherland  MRN: [id number]  Admission Date: 3/16/2020  Hospital Length of Stay: 5 days  Discharge Date and Time:  03/21/2020 11:58 AM  Attending Physician: Philip Castorena MD   Discharging Provider: Philip Castorena MD  Primary Care Provider: Bekah Squires MD      HPI:     Juice Sutherland is a 59 y.o. male that (in part)  has a past medical history of Diabetes mellitus, Hyperlipemia, and Hypertension.  has a past surgical history that includes Fracture surgery. Presents to [facility] Emergency Department shortness of breath associated with fever, chills, and cough.  Began 8 days ago with progressive worsening.  Was initially evaluated in the emergency department, tested negative for the flu, but was not hypoxemic therefore discharged to home.  Re-presented night with similar worsening symptoms.  Was not hypoxemic at rest, but oxygen levels desaturated to 87% with ambulation.  Denies recent travel, sick contacts, cruise trips, or use of [employer]/lift services.      In the emergency department routine laboratory studies, influenza testing, cardiac enzymes, and chest x-ray was obtained.  Chest x-ray concerning for bilateral diffuse nonspecific interstitial coarsening suggested of pulmonary edema versus infectious or inflammatory interstitial pneumonia.  No focal consolidations were identified.  Findings were suggestive of COVID-19 given the negative influenza testing, fever, transaminitis, low normal procalcitonin, and markedly elevated CRP levels.    Hospital medicine has been asked to admit to inpatient for further evaluation and treatment for acute hypoxemic respiratory failure and suspected COVID-19.     * No surgery found *      Hospital Course:   60 y/o male presents with shortness of breath, fever and chills.  Febrile and hypoxic on presentation.  Concern about possible COVID 19 infection.   Empirically started on Rocephin and Zithromax.  Influenza negative.  Supportive care and oxygen.  Patient continued with fevers, but hemodynamically stable during hospital stay.  His symptoms have improved.  Probable COVID 19 infection as no other source of infection.  Test still pending, but patient ready for discharge.  Patient will be given COVID 19 discharge instruction.  Follow up with PCP has been arranged.  He is currently afebrile with no hypoxia.  Addendum: Patient still slightly hypoxic on RA on ambulation with sats of 87%.  Slight dyspnea.  Will consult  for temporary home oxygen.     Consults:     No new Assessment & Plan notes have been filed under this hospital service since the last note was generated.  Service: Hospital Medicine    Final Active Diagnoses:    Diagnosis Date Noted POA    Suspected Covid-19 Virus Infection [R68.89] 03/17/2020 Yes    Essential hypertension [I10] 03/17/2020 Yes    Diabetes mellitus, type 2 [E11.9] 03/17/2020 Yes    Hyperlipidemia [E78.5] 03/17/2020 Yes    Transaminitis [R74.0] 03/17/2020 Yes      Problems Resolved During this Admission:    Diagnosis Date Noted Date Resolved POA    PRINCIPAL PROBLEM:  Acute hypoxemic respiratory failure [J96.01] 03/17/2020 03/21/2020 Yes    Febrile [R50.9] 03/17/2020 03/18/2020 Yes       Discharged Condition: stable    Disposition: Home or Self Care    Follow Up:  Follow-up Information     [facility] On 4/3/2020.    Why:  Hospital discharge follow up; Appointment scheduled April 3rd @ 11:00  Contact information:  [address] [place] [id number]  [phone]                 Patient Instructions:      Diet diabetic     Notify your health care provider if you experience any of the following:  temperature >100.4     Notify your health care provider if you experience any of the following:  persistent nausea and vomiting or diarrhea     Notify your health care provider if you  experience any of the following:  severe uncontrolled pain     Notify your health care provider if you experience any of the following:  difficulty breathing or increased cough     Notify your health care provider if you experience any of the following:  persistent dizziness, light-headedness, or visual disturbances     Notify your health care provider if you experience any of the following:  increased confusion or weakness     Activity as tolerated       Pending Diagnostic Studies:     Procedure Component Value Units Date/Time    SARS- CoV-2 (COVID-19) QUALITATIVE PCR [[id number]] Collected:  03/16/20 1940    Order Status:  Sent Lab Status:  In process Updated:  03/16/20 1957    Specimen:  Nasopharyngeal          Medications:  Reconciled Home Medications:      Medication List      START taking these medications    acetaminophen 325 MG tablet  Commonly known as:  TYLENOL  Take 2 tablets (650 mg total) by mouth every 4 (four) hours as needed for Pain or Temperature greater than (100).        CONTINUE taking these medications    benzocaine-menthoL 15-3.6 mg Lozg  Commonly known as:  CEPACOL SORE THROAT (TRAVIS-MEN)  1 lozenge by Mucous Membrane route every 3 (three) hours as needed (Sore Throat).     benzonatate 100 MG capsule  Commonly known as:  TESSALON  Take 1 capsule (100 mg total) by mouth 3 (three) times daily as needed for Cough.     cetirizine 10 MG tablet  Commonly known as:  ZYRTEC  Take 1 tablet (10 mg total) by mouth once daily.     fluticasone propionate 50 mcg/actuation nasal spray  Commonly known as:  FLONASE  1 spray (50 mcg total) by Each Nostril route 2 (two) times daily as needed.     Lactobacillus rhamnosus GG 10 billion cell capsule  Commonly known as:  CULTURELLE  Take 1 capsule by mouth once daily.     losartan 50 MG tablet  Commonly known as:  COZAAR  Take 1 tablet (50 mg total) by mouth once daily.     metFORMIN 1,000 mg 24hr tablet  Commonly known as:  FORTAMET  Take 1,000 mg by mouth daily  with breakfast.     methocarbamoL 500 MG Tab  Commonly known as:  ROBAXIN  Take 2 tablets (1,000 mg total) by mouth 3 (three) times daily as needed (Muscle Spasm/Pain).            Indwelling Lines/Drains at time of discharge:   Lines/Drains/Airways     None                 Time spent on the discharge of patient: >30 minutes  Patient was seen and examined on the date of discharge and determined to be suitable for discharge.         Philip Castorena MD  Department of Hospital Medicine  [facility]

## 2020-03-21 NOTE — PLAN OF CARE
Problem: Fall Injury Risk  Goal: Absence of Fall and Fall-Related Injury  Outcome: Ongoing, Progressing     Problem: Adult Inpatient Plan of Care  Goal: Plan of Care Review  Outcome: Ongoing, Progressing  Goal: Patient-Specific Goal (Individualization)  Outcome: Ongoing, Progressing  Goal: Absence of Hospital-Acquired Illness or Injury  Outcome: Ongoing, Progressing  Goal: Optimal Comfort and Wellbeing  Outcome: Ongoing, Progressing  Goal: Readiness for Transition of Care  Outcome: Ongoing, Progressing  Goal: Rounds/Family Conference  Outcome: Ongoing, Progressing     Problem: Diabetes Comorbidity  Goal: Blood Glucose Level Within Desired Range  Outcome: Ongoing, Progressing     Problem: Fever  Goal: Body Temperature in Desired Range  Outcome: Ongoing, Progressing     Problem: Infection  Goal: Infection Symptom Resolution  Outcome: Ongoing, Progressing  Intervention: Prevent or Manage Infection  Flowsheets (Taken 3/21/2020 5220)  Fever Reduction/Comfort Measures: lightweight clothing; medication administered  Infection Management: aseptic technique maintained  Isolation Precautions: airborne precautions maintained; contact precautions maintained; droplet precautions maintained     Problem: Breathing Pattern Ineffective  Goal: Effective Breathing Pattern  Outcome: Ongoing     No acute event overnight. No falls and any skin injury this shift. Afebrile. Tylenol for pain given twice, total relief noted as claimed. Patient able to maintained sats of 93-94% at 3L via nasal cannula. No further complaints made.

## 2020-03-21 NOTE — PLAN OF CARE
03/21/20 1207   Post-Acute Status   Post-Acute Authorization   (Home with follow-up)   Other Status No Post-Acute Service Needs   Discharge Delays None known at this time   Discharge Plan   Discharge Plan A Home with family   Discharge Plan B Home with family

## 2020-03-21 NOTE — BRIEF OP NOTE
Test for Home 02      02 sats on RA at rest..87-88%    02 sats on RA at exercise   85-87%    02 sats on 3L/NC at rest 93-94%    02 sats on 3L/NC at exercise 91-93%

## 2020-03-21 NOTE — NURSING
Report received from MARÍA ELENA Huerta. Patient resting comfortably in bed, no acute distress noted. He is on oxygen at 4L via nasal cannula. Plan of care reviewed with patient. Instructed patient to call for assistance before ambulating, side rails up x2, bed alarm set, call light in reach, non skid socks in use.No redness or swelling noted. Airborne/contact/droplet precautions maintained. Patient verbalized understanding of instructions. Will continue to monitor.

## 2020-03-21 NOTE — PLAN OF CARE
03/21/20 1546   Final Note   Assessment Type Final Discharge Note   Anticipated Discharge Disposition   (DME: [facility])   What phone number can be called within the next 1-3 days to see how you are doing after discharge?   ([phone])   Discharge plans and expectations educations in teach back method with documentation complete? Yes   Right Care Referral Info   Post Acute Recommendation Other   Referral Type DME-O2   Facility Name [facility] [address] [place], [place], [place]   Post-Acute Status   Post-Acute Authorization HME  ([facility])   HME Status Set-up Complete

## 2020-03-23 NOTE — PHYSICIAN QUERY
PT Name: Juice Sutherland  MR #: [id number]     Physician Query Form - Diagnosis Clarification      CDS/: Alexandra Lizarraga               Contact information:[email or web address]    This form is a permanent document in the medical record.     Query Date: March 23, 2020    By submitting this query, we are merely seeking further clarification of documentation.  Please utilize your independent clinical judgment when addressing the question(s) below.     The medical record contains the following:      Findings Supporting Clinical Information Location in Medical Record   Pneumonia of both lungs due to infectious organism, unspecified part of lung     67-year-old male with history of hypertension, diabetes hyperlipidemia presenting for evaluation of a day history of fever, cough and shortness of breath.  Patient is febrile, tachycardic, not hypoxic.  SpO2 87% with ambulation.  Cardiac workup is negative.  CBC without leukocytosis.  CMP with transaminitis.  Influenza swab is negative.  Chest x-ray is remarkable for interstitial coarsening suggesting pulmonary edema versus infectious or inflammatory interstitial pneumonia. Discussed pt with Dr Vega who discussed the pt with Dr. Martinez. Pt accepted for admission and testing for COVID 19. CRP elevated. Procalcitonin pending. Rocephin IV and azithromycin ordered. Pt vitals stable on 4L O2.  Discussed pt with Dr. Vega who also evaluated pt face to face and she agrees with assessment and plan       Chest x-ray concerning for bilateral diffuse nonspecific interstitial coarsening suggested of pulmonary edema versus infectious or inflammatory interstitial pneumonia.  No focal consolidations were identified.     IV Azithromycin     IV Rocephin ED MD                                               Discharge summary              Mar 3-17 to 3-20         Please clarify if the Pneumonia diagnosis has been:    [ x ] Ruled In   [  ] Ruled Out   [  ] Other/Clarification  of findings (please specify):     [  ] Clinically undetermined     Please document in your progress notes daily for the duration of treatment, until resolved, and include in your discharge summary.

## 2020-03-25 ENCOUNTER — PATIENT OUTREACH (OUTPATIENT)
Dept: ADMINISTRATIVE | Facility: CLINIC | Age: 60
End: 2020-03-25

## 2020-03-28 ENCOUNTER — TELEPHONE (OUTPATIENT)
Dept: FAMILY MEDICINE | Facility: CLINIC | Age: 60
End: 2020-03-28

## 2020-03-28 LAB
SARS-COV-2 RNA RESP QL NAA+PROBE: DETECTED
SPECIMEN SOURCE: ABNORMAL

## 2020-03-28 NOTE — TELEPHONE ENCOUNTER
This result was communicated to the patient by Krystle Wyatt MD on 03/28/2020.    Informed patient that his test was POSITIVE for COVID-19 (coronavirus).     [facility]  Preventing the Spread of Coronavirus Disease in Homes and Residential Communities Guidelines      Prevention steps for people with confirmed or suspected COVID-19 (including persons under investigation) who do not need to be hospitalized and people with confirmed COVID-19 who were hospitalized and determined to be medically stable to go home.     Stay home except to get medical care.   Separate yourself from other people and animals in your home   Call ahead before visiting your doctor.   Wear a facemask.   Cover your coughs and sneezes.   Clean your hands often.   Avoid sharing personal household items.   Clean all high-touch surfaces every day.   Monitor your symptoms. Seek prompt medical attention if your illness is worsening (e.g., difficulty breathing). Before seeking care, call your healthcare provider.   If you have a medical emergency and need to call 911, notify the dispatch personnel that you have, or are being evaluated for COVID-19. If possible, put on a facemask before emergency medical services arrive.      Recommended precautions for household members, intimate partners, and caregivers in a nonhealthcare setting of a patient with symptomatic laboratory-confirmed COVID-19.  Household members, intimate partners, and caregivers in a nonhealthcare setting may have close contact with a person with symptomatic, laboratory-confirmed COVID-19 or a person under investigation. Close contacts should monitor their health; they should call their healthcare provider right away if they develop symptoms suggestive of COVID-19 (e.g., fever, cough, shortness of breath).    Close contacts should also follow these recommendations:   Make sure that you understand and can help the patient follow their  healthcare provider's instructions for medication(s) and care. You should help the patient with basic needs in the home and provide support for getting groceries, prescriptions, and other personal needs.   Monitor the patient's symptoms. If the patient is getting sicker, call his or her healthcare provider and tell them that the patient has laboratory-confirmed COVID-19. This will help the healthcare provider's office take steps to keep other people in the office or waiting room from getting infected. Ask the healthcare provider to call the local or [facility] health department for additional guidance. If the patient has a medical emergency and you need to call 911, notify the dispatch personnel that the patient has, or is being evaluated for COVID-19.   Household members should stay in another room or be  from the patient as much as possible. Household members should use a separate bedroom and bathroom, if available.   Prohibit visitors who do not have an essential need to be in the home.   Household members should care for any pets in the home. Do not handle pets or other animals while sick.   Make sure that shared spaces in the home have good air flow, such as by an air conditioner or an opened window, weather permitting.   Perform hand hygiene frequently. Wash your hands often with soap and water for at least 20 seconds or use an alcohol-based hand  that contains 60 to 95% alcohol, covering all surfaces of your hands and rubbing them together until they feel dry. Soap and water should be used preferentially if hands are visibly dirty.   Avoid touching your eyes, nose, and mouth with unwashed hands.   The patient should wear a facemask when you are around other people. If the patient is not able to wear a facemask (for example, because it causes trouble breathing), you, as the caregiver should wear a mask when you are in the same room as the patient.   Wear a disposable facemask and gloves  when you touch or have contact with the patient's blood, stool, or body fluids, such as saliva, sputum, nasal mucus, vomit, urine.  o Throw out disposable facemasks and gloves after using them. Do not reuse.  o When removing personal protective equipment, first remove and dispose of gloves. Then, immediately clean your hands with soap and water or alcohol-based hand . Next, remove and dispose of facemask, and immediately clean your hands again with soap and water or alcohol-based hand .   Avoid sharing household items with the patient. You should not share dishes, drinking glasses, cups, eating utensils, towels, bedding, or other items. After the patient uses these items, you should wash them thoroughly (see below Wash laundry thoroughly).   Clean all high-touch surfaces, such as counters, tabletops, doorknobs, bathroom fixtures, toilets, phones, keyboards, tablets, and bedside tables, every day. Also, clean any surfaces that may have blood, stool, or body fluids on them.   Use a household cleaning spray or wipe, according to the label instructions. Labels contain instructions for safe and effective use of the cleaning product including precautions you should take when applying the product, such as wearing gloves and making sure you have good ventilation during use of the product.   Wash laundry thoroughly.  o Immediately remove and wash clothes or bedding that have blood, stool, or body fluids on them.  o Wear disposable gloves while handling soiled items and keep soiled items away from your body. Clean your hands (with soap and water or an alcohol-based hand ) immediately after removing your gloves.  o Read and follow directions on labels of laundry or clothing items and detergent. In general, using a normal laundry detergent according to washing machine instructions and dry thoroughly using the warmest temperatures recommended on the clothing label.   Place all used disposable  gloves, facemasks, and other contaminated items in a lined container before disposing of them with other household waste. Clean your hands (with soap and water or an alcohol-based hand ) immediately after handling these items. Soap and water should be used preferentially if hands are visibly dirty.   Discuss any additional questions with your state or local health department or healthcare provider. Check available hours when contacting your local health department.    For more information see CDC link below.      https://www.cdc.gov/coronavirus/2019-ncov/hcp/guidance-prevent-spread.html#precautions            Patient voiced understanding and all questions answered.

## 2020-03-28 NOTE — PROVIDER PROGRESS NOTES - EMERGENCY DEPT.
Encounter Date: 3/16/2020    ED Physician Progress Notes               Patient called to discuss COVID results. Notified of positive results. States he is feeling better. Strict ED return precautions given.

## 2020-03-30 NOTE — PHYSICIAN QUERY
PT Name: Juice Sutherland  MR #: [id number]    Physician Query Form - Cause and Effect Relationship Clarification      CDS/: Alexandra Lizarraga               Contact information:[email or web address]    This form is a permanent document in the medical record.     Query Date: March 30, 2020    By submitting this query, we are merely seeking further clarification of documentation. Please utilize your independent clinical judgment when addressing the question(s) below.    The Medical record contains the following:  Supporting Clinical Findings   Location in record   Pneumonia of both lungs due to infectious organism, unspecified part of lung     67-year-old male with history of hypertension, diabetes hyperlipidemia presenting for evaluation of a day history of fever, cough and shortness of breath.  Patient is febrile, tachycardic, not hypoxic.  SpO2 87% with ambulation.  Cardiac workup is negative.  CBC without leukocytosis.  CMP with transaminitis.  Influenza swab is negative.  Chest x-ray is remarkable for interstitial coarsening suggesting pulmonary edema versus infectious or inflammatory interstitial pneumonia. Discussed pt with Dr Vega who discussed the pt with Dr. Martinez. Pt accepted for admission and testing for COVID 19. CRP elevated. Procalcitonin pending. Rocephin IV and azithromycin ordered. Pt vitals stable on 4L O2.  Discussed pt with Dr. Vega who also evaluated pt face to face and she agrees with assessment and plan         Chest x-ray concerning for bilateral diffuse nonspecific interstitial coarsening suggested of pulmonary edema versus infectious or inflammatory interstitial pneumonia.  No focal consolidations were identified.      IV Azithromycin      IV Rocephin    Pneumonia ruled in                                                                                                                                                                                      ED  MD        Discharge summary                                                              Mar 3-17 to 3-20        Query form                                                                           Lab 3-16                                                                                                                            Provider, please clarify if there is any correlation between Pneumonia and COVID 19.           Are the conditions:      [ x ] Due to or associated with each other   [  ] Unrelated to each other   [  ] Other (Please Specify): _________________________   [  ] Clinically Undetermined

## 2020-04-28 ENCOUNTER — RESEARCH ENCOUNTER (OUTPATIENT)
Dept: RESEARCH | Facility: HOSPITAL | Age: 60
End: 2020-04-28

## 2020-04-28 NOTE — PROGRESS NOTES
The Patient Juice Sutherland was called today regarding the patient's participation in (IRB #[id number] PI: Jann).   The Verbal Informed Consent was read and discussed by the consenter. The following was discussed:   Types of specimens to be collected   All medical information released to researchers will be stripped of identifiers and no patient information will be given to anyone outside of this research project.    Participating in a research study is not the same as getting regular medical care and will not improve the patient's health. The purpose of a research study is to gather information.  Being in this study does not interfere with your regular medical care.   The patient does not have to participate in this study. If they do not join, their care at [facility] will not be affected.  The person granting permission was provided adequate time to ask questions regarding the scope and purpose of the study.  Permission was obtained by telephone.   The above statements were read by the person obtaining permission to the person granting permission and witnessed by Efra Giles, who also confirmed the patient's consent to the study. The witness information was documented on the verbal consent form as well.  This Verbal Informed Consent process was conducted prior to initiation of any study procedures.

## 2020-05-07 ENCOUNTER — TELEPHONE (OUTPATIENT)
Dept: ADMINISTRATIVE | Facility: HOSPITAL | Age: 60
End: 2020-05-07

## 2020-05-07 ENCOUNTER — TELEPHONE (OUTPATIENT)
Dept: RESEARCH | Facility: OTHER | Age: 60
End: 2020-05-07

## 2020-05-07 RX ORDER — ERGOCALCIFEROL 1.25 MG/1
CAPSULE ORAL
COMMUNITY
Start: 2020-04-29

## 2020-05-07 RX ORDER — MELOXICAM 15 MG/1
TABLET ORAL
COMMUNITY
Start: 2020-02-26 | End: 2020-08-04

## 2020-05-07 RX ORDER — HYDROCODONE BITARTRATE AND ACETAMINOPHEN 10; 325 MG/1; MG/1
TABLET ORAL
COMMUNITY
Start: 2020-02-26

## 2020-05-07 RX ORDER — MULTIVITAMIN
1 TABLET ORAL
COMMUNITY

## 2020-05-07 RX ORDER — INSULIN GLARGINE 100 [IU]/ML
INJECTION, SOLUTION SUBCUTANEOUS
COMMUNITY
Start: 2020-04-16

## 2020-05-07 RX ORDER — METFORMIN HYDROCHLORIDE 500 MG/1
TABLET ORAL
COMMUNITY
Start: 2020-04-03

## 2020-05-07 RX ORDER — FENOFIBRATE 43 MG/1
CAPSULE ORAL
COMMUNITY
Start: 2020-04-09

## 2020-05-07 RX ORDER — ATORVASTATIN CALCIUM 40 MG/1
TABLET, FILM COATED ORAL
COMMUNITY
Start: 2020-04-03

## 2020-05-07 RX ORDER — VARDENAFIL HYDROCHLORIDE 20 MG/1
TABLET, FILM COATED ORAL
COMMUNITY
Start: 2020-04-16

## 2020-05-07 RX ORDER — ZOLPIDEM TARTRATE 5 MG/1
TABLET ORAL
COMMUNITY
Start: 2020-04-03

## 2020-05-07 NOTE — TELEPHONE ENCOUNTER
Contacted patient to confirm his scheduled 3:00pm COVID - 19 Convalescent appointment in the [facility].  Patient did not answer any calls placed 2:00pm; 3:00pm or 3:30pm.  Messages were left for patient to contact me at [phone] to reschedule appointment.

## 2020-05-28 ENCOUNTER — CLINICAL SUPPORT (OUTPATIENT)
Dept: AUDIOLOGY | Facility: CLINIC | Age: 60
End: 2020-05-28
Payer: COMMERCIAL

## 2020-05-28 DIAGNOSIS — H81.12 BPPV (BENIGN PAROXYSMAL POSITIONAL VERTIGO), LEFT: ICD-10-CM

## 2020-05-28 DIAGNOSIS — H93.13 TINNITUS OF BOTH EARS: ICD-10-CM

## 2020-05-28 DIAGNOSIS — H90.3 SENSORINEURAL HEARING LOSS, BILATERAL: Primary | ICD-10-CM

## 2020-05-28 PROCEDURE — 92557 COMPREHENSIVE HEARING TEST: CPT | Mod: S$GLB,,, | Performed by: AUDIOLOGIST

## 2020-05-28 PROCEDURE — 92550 PR TYMPANOMETRY AND REFLEX THRESHOLD MEASUREMENTS: ICD-10-PCS | Mod: S$GLB,,, | Performed by: AUDIOLOGIST

## 2020-05-28 PROCEDURE — 92550 TYMPANOMETRY & REFLEX THRESH: CPT | Mod: S$GLB,,, | Performed by: AUDIOLOGIST

## 2020-05-28 PROCEDURE — 92557 PR COMPREHENSIVE HEARING TEST: ICD-10-PCS | Mod: S$GLB,,, | Performed by: AUDIOLOGIST

## 2020-05-28 PROCEDURE — 95992 PR CANALITH REPOSITIONING PROCEDURE, PER DAY: ICD-10-PCS | Mod: S$GLB,,, | Performed by: AUDIOLOGIST

## 2020-05-28 PROCEDURE — 95992 CANALITH REPOSITIONING PROC: CPT | Mod: S$GLB,,, | Performed by: AUDIOLOGIST

## 2020-05-28 NOTE — PROGRESS NOTES
Click on link to view Audiogram  Document on 5/28/2020  9:40 AM by Madeline Tamayo MA CCC-A: Audiogram    Juice Sutherland was seen today for an Audiologic evaluation for hearing loss and dizziness.  He reported positional vertigo that began approximately one year ago.  He also reported hearing loss and tinnitus in both ears.    Tympanometry revealed a Type A tympanogram bilaterally.  Audiogram results revealed a mild sensorineural hearing loss bilaterally.  Speech audiometry revealed a speech reception threshold at 15dBHL with a word recognition score of 100% for the right ear and a speech reception threshold at 20dBHL with a word recognition score of 100% for the left ear.  The Mosby-Hallpike was positive for BPPV to the left side and an Epley maneuver was completed to the left side.    Recommendations:  1. Otologic evaluation  2. Return in one week for repeat Epley if symptoms persist  3. Annual Audiogram

## 2020-06-05 ENCOUNTER — TELEPHONE (OUTPATIENT)
Dept: OTOLARYNGOLOGY | Facility: CLINIC | Age: 60
End: 2020-06-05

## 2020-06-05 NOTE — TELEPHONE ENCOUNTER
----- Message from Vicki Dodson sent at 6/5/2020  9:30 AM CDT -----  Contact: Self [phone]  Type:  Patient Returning Call    Who Called: Self    Who Left Message for Patient: Eun    Does the patient know what this is regarding?:    Would the patient rather a call back or a response via [facility]? Call back    Best Call Back Number: [phone]

## 2020-06-08 ENCOUNTER — OFFICE VISIT (OUTPATIENT)
Dept: OTOLARYNGOLOGY | Facility: CLINIC | Age: 60
End: 2020-06-08
Payer: COMMERCIAL

## 2020-06-08 VITALS
BODY MASS INDEX: 28.96 KG/M2 | SYSTOLIC BLOOD PRESSURE: 122 MMHG | DIASTOLIC BLOOD PRESSURE: 72 MMHG | HEIGHT: 65 IN | TEMPERATURE: 98 F | WEIGHT: 173.81 LBS

## 2020-06-08 DIAGNOSIS — H83.2X3 VESTIBULAR DISEQUILIBRIUM, BILATERAL: ICD-10-CM

## 2020-06-08 PROCEDURE — 99203 OFFICE O/P NEW LOW 30 MIN: CPT | Mod: S$GLB,,, | Performed by: OTOLARYNGOLOGY

## 2020-06-08 PROCEDURE — 99203 PR OFFICE/OUTPT VISIT, NEW, LEVL III, 30-44 MIN: ICD-10-PCS | Mod: S$GLB,,, | Performed by: OTOLARYNGOLOGY

## 2020-06-08 NOTE — PROGRESS NOTES
OTOLARYNGOLOGY CLINIC NOTE  Date:  06/08/2020     Chief complaint:  Chief Complaint   Patient presents with    Follow-up     Hearing test results    Dizziness     when he lays down       History of Present Illness  Juice Sutherland is a 59 y.o. male  presenting today for a new evaluation and treatment of dizziness.   When lays down has dizziness, everything turns upside down. When lean head back or lays flat. Often times this is with sexual activity as he normally is able to not lay flat etc when going to sleep ( pt mentioned this discreetly toward end of visit- further questioning of other things that could contribute to valsalva induced dizziness such as coughing/straining for a BM do not instigate dizziness, sometimes happens with a large sneeze that he feels dizzy). He does not notice dizziness with change in altitude - he climbs on towers for work sometimes and has not had issue.Dizzy episodes last  about 30 seconds. No vomiting . Feels a bit off in between episodes. This started a year ago. Happens every time lays flat. No issue with driving or riding in car. No falls. Has not gotten worse because he tries to not lay back. No imbalance with sitting to standing  Has had haering test with work- has mild loss known, no known change that can feel, has been told . Told had hearing loss at age 20-30s  No family hearing loss at young age    epley maneuver done 5-28-20 by audiologist -still with symptoms.    He had COVID and had ansomia and decreased taste, has been improving but not totally back to normal   Was on flonase and zyrtec for covid ; no history of seasonal allergies    Past Medical History  Past Medical History:   Diagnosis Date    Diabetes mellitus     Hyperlipemia     Hypertension         Past Surgical History  Past Surgical History:   Procedure Laterality Date    FRACTURE SURGERY- leg , age 16      rotator cuff surgery    Medications  Current Outpatient Medications on File Prior to Visit   Medication  Sig Dispense Refill    acetaminophen (TYLENOL) 325 MG tablet Take 2 tablets (650 mg total) by mouth every 4 (four) hours as needed for Pain or Temperature greater than (100). 30 tablet 0    atorvastatin (LIPITOR) 40 MG tablet       BASAGLAR KWIKPEN U-100 INSULIN glargine 100 units/mL (3mL) SubQ pen       benzocaine-menthoL (CEPACOL SORE THROAT, TRAVIS-MEN,) 15-3.6 mg Lozg 1 lozenge by Mucous Membrane route every 3 (three) hours as needed (Sore Throat). 16 lozenge 0    benzonatate (TESSALON) 100 MG capsule Take 1 capsule (100 mg total) by mouth 3 (three) times daily as needed for Cough. 20 capsule 0    cetirizine (ZYRTEC) 10 MG tablet Take 1 tablet (10 mg total) by mouth once daily. 30 tablet 0    fenofibrate micronized (ANTARA) 43 mg capsule       fluticasone propionate (FLONASE) 50 mcg/actuation nasal spray 1 spray (50 mcg total) by Each Nostril route 2 (two) times daily as needed. 15 g 0    HYDROcodone-acetaminophen (NORCO)  mg per tablet       Lactobacillus rhamnosus GG (CULTURELLE) 10 billion cell capsule Take 1 capsule by mouth once daily.      LEVITRA 20 mg tablet       losartan (COZAAR) 50 MG tablet Take 1 tablet (50 mg total) by mouth once daily. 30 tablet 0    meloxicam (MOBIC) 15 MG tablet       metFORMIN (FORTAMET) 1,000 mg 24hr tablet Take 1,000 mg by mouth daily with breakfast.      metFORMIN (GLUCOPHAGE) 500 MG tablet       methocarbamol (ROBAXIN) 500 MG Tab Take 2 tablets (1,000 mg total) by mouth 3 (three) times daily as needed (Muscle Spasm/Pain). 18 tablet 0    multivitamin (THERAGRAN) per tablet Take 1 tablet by mouth.      VITAMIN D2 1,250 mcg (50,000 unit) capsule       zolpidem (AMBIEN) 5 MG Tab        No current facility-administered medications on file prior to visit.        Review of Systems  Review of Systems   Eyes: Negative for double vision.   Respiratory: Negative for cough.    Cardiovascular: Negative for palpitations, claudication (used to get charley horse,  "improved on potassium pills leg crampings sometimes happens at night) and leg swelling.   Musculoskeletal: Negative for falls.   Neurological: Positive for dizziness. Negative for sensory change and headaches (every now and then , back of head. had slight headach after epley manuever).        Social History   reports that he quit smoking about 10 years ago. He has never used smokeless tobacco. He reports that he drinks about 1.0 standard drinks of alcohol per week. He reports that he does not use drugs.     Family History  Brother with migraines and sickle cell anemia  No heart attack or stroke.    Physical Exam   Vitals:    06/08/20 1553   BP: 122/72   Temp: 97.5 °F (36.4 °C)    Body mass index is 28.93 kg/m².  Weight: 78.8 kg (173 lb 13.3 oz)   Height: 5' 5" (165.1 cm)     GENERAL: alert, no acute distress.  HEAD: normocephalic.   SKIN: no lesions of skin of head and neck area.  EYES: lids and lashes normal. Pupils equal and reactive to light. Extraocular motions intact. No proptosis. No nystagmus at rest.  EARS: external ear without lesion, normal pinna shape and position.  External auditory canal with normal cerumen, tympanic membrane fully visible, no perforation , mild retraction bilateral. No middle ear effusion. Ossicles intact.   NOSE: external nose without abnormality, septum grossly midline on anterior rhinoscopy, mild erythema and edema of turbinates.  ORAL CAVITY/OROPHARYNX: dentition fair- upper plate/partial denture, no mass or lesion of gingiva/buccal mucosa/floor of mouth/tongue. tongue midline and mobile. No fasciculations. Floor of mouth and base of tongue soft to palpation. Symmetric palate rise. Uvula midline.   NECK: trachea midline. No discrete palpable thyroid nodule. No parotid mass nor tenderness. No submandibular gland mass nor tenderness. No scars.  LYMPH NODES:No cervical lymphadenopathy.  RESPIRATORY: no stridor, no stertor. Voice normal. Respirations nonlabored.  NEURO: alert, responds " to questions appropriately.   Cranial nerve exam as indicated in above sections and additionally showed intact facial sensation to light touch bilaterally in V1,V2 and V3. Facial movement symmetric with good eye closure and symmetric smile. Tandem gait normal, negative romberg. No dysmetria, no dysdiadochokinesia. Strong shoulder shrug against resistance. danae hallpike  Did not show nystagmus , pt did note subjective dizziness when laid down on the right and left sides, subjective dizziness sensation worse when laid down on the left side. Pt also noted subjective dizziness with sitting up on both sides, again no nystagmus noted. Dizziness noted almost immediately upon lying down with no delay.   PSYCH:mood appropriate    AUDIOLOGY RESULTS  Audiometric evaluation including audiogram, tympanometry, acoustic reflexes, and speech discrimination which was performed  was personally reviewed and interpreted.  Notable findings on the audiogram were bilateral mild sensorineural hearing loss with 10 dbHL difference at 10 Khz with left ear being worse .  Tympanometry revealed Type A bilaterally. Speech discrimination was 100%    Report of the audiologist performing this audiometric testing was also reviewed     Imaging:  The patient does not have any pertinent and/or recent imaging of the head and neck.     Labs:  No labs in past 3 months    Assessment  1. Asymmetrical hearing loss of left ear  - MRI IAC/Temporal Bones W W/O Contrast; Future  - CREATININE, SERUM; Future    2. Vestibular disequilibrium, bilateral  - Ambulatory referral/consult to Audiology; Future       Plan:  Discussed plan of care with patient in detail and all questions answered. Patient reported understanding of plan of care.     Possible superior canal dehiscence or fistula given strain related dizzy symptoms  Audio at main campus to do workup for dizziness-- VNG, fistula test etc.  Mri IAC for dizziness ( rule out encephalocele of skull base thinning- may  need ct temporal bone with poschl view). Mild asymmetry of hearing loss as well- left is worse.    Follow up after testing- can do virtual visit unless something on tests indicate need for in person eval

## 2020-06-12 ENCOUNTER — TELEPHONE (OUTPATIENT)
Dept: RADIOLOGY | Facility: HOSPITAL | Age: 60
End: 2020-06-12

## 2020-06-15 ENCOUNTER — TELEPHONE (OUTPATIENT)
Dept: OTOLARYNGOLOGY | Facility: CLINIC | Age: 60
End: 2020-06-15

## 2020-06-15 NOTE — TELEPHONE ENCOUNTER
Called patient to inform him that I set up his VGN test for June 23, 2020 @ 1pm at [facility].

## 2020-06-16 ENCOUNTER — HOSPITAL ENCOUNTER (OUTPATIENT)
Dept: RADIOLOGY | Facility: HOSPITAL | Age: 60
Discharge: HOME | End: 2020-06-16
Attending: OTOLARYNGOLOGY
Payer: COMMERCIAL

## 2020-06-16 PROCEDURE — 70553 MRI BRAIN STEM W/O & W/DYE: CPT | Mod: 26,,, | Performed by: RADIOLOGY

## 2020-06-16 PROCEDURE — 70553 MRI BRAIN STEM W/O & W/DYE: CPT | Mod: TC

## 2020-06-16 PROCEDURE — 70553 MRI IAC/TEMPORAL BONES W W/O CONTRAST: ICD-10-PCS | Mod: 26,,, | Performed by: RADIOLOGY

## 2020-06-16 PROCEDURE — A9585 GADOBUTROL INJECTION: HCPCS | Performed by: OTOLARYNGOLOGY

## 2020-06-16 PROCEDURE — 25500020 PHARM REV CODE 255: Performed by: OTOLARYNGOLOGY

## 2020-06-16 RX ORDER — GADOBUTROL 604.72 MG/ML
8 INJECTION INTRAVENOUS
Status: COMPLETED | OUTPATIENT
Start: 2020-06-16 | End: 2020-06-16

## 2020-06-16 RX ADMIN — GADOBUTROL 8 ML: 604.72 INJECTION INTRAVENOUS at 04:06

## 2020-06-23 ENCOUNTER — CLINICAL SUPPORT (OUTPATIENT)
Dept: AUDIOLOGY | Facility: CLINIC | Age: 60
End: 2020-06-23
Payer: COMMERCIAL

## 2020-06-23 DIAGNOSIS — H81.391 PERIPHERAL VERTIGO INVOLVING RIGHT EAR: Primary | ICD-10-CM

## 2020-06-23 PROCEDURE — 92537 CALORIC VSTBLR TEST W/REC: CPT | Mod: S$GLB,,, | Performed by: AUDIOLOGIST

## 2020-06-23 PROCEDURE — 92537 PR CALORIC VSTBLR TEST W/REC BITHERMAL: ICD-10-PCS | Mod: S$GLB,,, | Performed by: AUDIOLOGIST

## 2020-06-23 PROCEDURE — 92540 PR VESTIBULAR EVAL NYSTAG FOVL&PERPH STIM OSCIL TRACKING: ICD-10-PCS | Mod: S$GLB,,, | Performed by: AUDIOLOGIST

## 2020-06-23 PROCEDURE — 92540 BASIC VESTIBULAR EVALUATION: CPT | Mod: S$GLB,,, | Performed by: AUDIOLOGIST

## 2020-06-23 NOTE — Clinical Note
Hi Dr. Allen,  Please find the results of Mr. Sutherland's VNG evaluation.  Please let me know if I can provide any additional information.    Best,  Laura Jang, CCC-A

## 2020-06-23 NOTE — PROGRESS NOTES
VNG Evaluation    Referring physician:  Dr. Allen    59 y.o. male complains of vertigo, dizziness, imbalance and headache.  Symptoms are provoked by quick head turns, reclining into bed and rolling over to either side in bed and have been recurring over the past 1 year.  Mr. Sutherland reported that each episode lasts for approximately 20 seconds and he feels better with his eyes closed.  It should be noted that Mr. Sutherland had a left Epley maneuver on May 28, 2020 and he stated that it did not seem to improve the dizziness.  He denied taking any medication that would affect today's testing.  Mr. Sutherland tested positive for COVID-19 in 2020.  He stated he has been asymptomatic for 3 months.      Gaze nystagmus was absent.     Oculomotor function was normal.    Spontaneous nystagmus was absent.    The head-hanging left Hallpike was negative.    The head-hanging right Hallpike was negative.    Static positional nystagmus was absent.    Unilateral weakness: 39% (right)  Directional preponderance: 32% (left-beating)    RW: 18 d/s  LW: 70 d/s  RC: 27 d/s   d/s    Fixation suppression was normal.    Impression: VNG results suggest a right peripheral vestibular abnormality.    Recommend: 1. A trial with Cawthorne exercises to help reduce subjective symptoms.  A printed copy of today's exercises was provided to Mr. Sutherland at today's appointment.  2. Follow-up with Dr. Allen to review results of today's evaluation.

## 2020-07-14 ENCOUNTER — PATIENT MESSAGE (OUTPATIENT)
Dept: OTOLARYNGOLOGY | Facility: CLINIC | Age: 60
End: 2020-07-14

## 2020-07-16 ENCOUNTER — TELEPHONE (OUTPATIENT)
Dept: OTOLARYNGOLOGY | Facility: CLINIC | Age: 60
End: 2020-07-16

## 2020-07-16 NOTE — TELEPHONE ENCOUNTER
----- Message from Namrata Allen MD sent at 7/16/2020  7:42 AM CDT -----  Regarding: RE: RESULTS  Contact: Patient  Could you please let him know that I tried to message him in the portal and to apologize that I thought he had been set up for post test visit . I can do a virtual or in person visit to review results, or if he prefers I can call him over the phone to discuss. Please let me know if any other concerns. thanks  ----- Message -----  From: Ashley Bess MA  Sent: 7/15/2020   4:08 PM CDT  To: Namrata Allen MD  Subject: FW: RESULTS                                        ----- Message -----  From: Tamra Adames  Sent: 7/15/2020   3:55 PM CDT  To: Alejandro ARNOLD Staff  Subject: RESULTS                                          Type:  Test Results    Who Called: Patient     Name of Test (Lab/Mammo/Etc): Lab    Date of Test: 6/16/2020    Ordering Provider: Dr. Allen     Where the test was performed: [facility]    Would the patient rather a call back or a response via [facility]? Call back     Best Call Back Number:  [phone]

## 2020-07-21 ENCOUNTER — TELEPHONE (OUTPATIENT)
Dept: OTOLARYNGOLOGY | Facility: CLINIC | Age: 60
End: 2020-07-21

## 2020-07-21 NOTE — TELEPHONE ENCOUNTER
----- Message from Namrata Allen MD sent at 7/16/2020  7:42 AM CDT -----  Regarding: RE: RESULTS  Contact: Patient  Could you please let him know that I tried to message him in the portal and to apologize that I thought he had been set up for post test visit . I can do a virtual or in person visit to review results, or if he prefers I can call him over the phone to discuss. Please let me know if any other concerns. thanks  ----- Message -----  From: Ashley Bess MA  Sent: 7/15/2020   4:08 PM CDT  To: Namrata Allen MD  Subject: FW: RESULTS                                        ----- Message -----  From: Tamra dAames  Sent: 7/15/2020   3:55 PM CDT  To: Alejandro ARNOLD Staff  Subject: RESULTS                                          Type:  Test Results    Who Called: Patient     Name of Test (Lab/Mammo/Etc): Lab    Date of Test: 6/16/2020    Ordering Provider: Dr. Allen     Where the test was performed: [facility]    Would the patient rather a call back or a response via [facility]? Call back     Best Call Back Number:  [phone]

## 2020-08-04 ENCOUNTER — OFFICE VISIT (OUTPATIENT)
Dept: OTOLARYNGOLOGY | Facility: CLINIC | Age: 60
End: 2020-08-04
Payer: MEDICAID

## 2020-08-04 VITALS
BODY MASS INDEX: 28.47 KG/M2 | TEMPERATURE: 98 F | DIASTOLIC BLOOD PRESSURE: 80 MMHG | SYSTOLIC BLOOD PRESSURE: 120 MMHG | WEIGHT: 170.88 LBS | HEIGHT: 65 IN

## 2020-08-04 DIAGNOSIS — H90.3 SENSORINEURAL HEARING LOSS (SNHL) OF BOTH EARS: ICD-10-CM

## 2020-08-04 DIAGNOSIS — R42 DIZZINESS: Primary | ICD-10-CM

## 2020-08-04 PROCEDURE — 99213 PR OFFICE/OUTPT VISIT, EST, LEVL III, 20-29 MIN: ICD-10-PCS | Mod: S$GLB,,, | Performed by: OTOLARYNGOLOGY

## 2020-08-04 PROCEDURE — 99213 OFFICE O/P EST LOW 20 MIN: CPT | Mod: S$GLB,,, | Performed by: OTOLARYNGOLOGY

## 2020-08-04 RX ORDER — IBUPROFEN 600 MG/1
600 TABLET ORAL 2 TIMES DAILY PRN
Qty: 30 TABLET | Refills: 0 | Status: SHIPPED | OUTPATIENT
Start: 2020-08-04

## 2020-08-04 NOTE — PROGRESS NOTES
OTOLARYNGOLOGY CLINIC NOTE  Date:  08/04/2020     Chief complaint:  Chief Complaint   Patient presents with    Follow-up     CT Results       History of Present Illness  Juice Sutherland is a 59 y.o. male  presenting today for a follow up of dizziness. He has undergone VNG which was concerning for a possible right sided vestibular weakness. Prior Epley maneuver and findings suggestive of BPPV were noted on the left side at visit before he had the VNG. He is stil having issues with dizziness.  Last about 10-15 seconds and occurs with lying back /head movement. Still describing episodes that when lays down has dizziness, everything turns upside down. Occurs with  leaning head back or with laying flat. Occurs with sexual activity ( because of laying flat- does not seem to happen with other straining type of events- I.e. not with coughing or BM). He does not notice dizziness with change in altitude - he climbs on towers for work sometimes and has not had issue. No vomiting. Feels off after the episode and gets posterior/occipital type headaches. He has had hearing loss since around mid20's 30s. Hearing test at prior visit 5-28-20 showed mild SNHL at 4 Khz ( slightlly worse on the left) . MRI was negative for retrocochlear pathology therefore suspect that 4KHz loss is due to  noise exposure.  Prior cranial nerve exam and cerebellar testing has been normal.    He had COVID and had ansomia and decreased taste, has been improving.No postnasal drainage no congestion . Was on flonase and zyrtec for covid ; no history of seasonal allergies. His mri also showed a micheline bullosa and left maxillary and right frontal mucosal edema.    Past Medical History  Past Medical History:   Diagnosis Date    Diabetes mellitus     Hyperlipemia     Hypertension         Past Surgical History  Past Surgical History:   Procedure Laterality Date    FRACTURE SURGERY- leg , age 16      rotator cuff surgery    Medications  Current Outpatient  Medications on File Prior to Visit   Medication Sig Dispense Refill    acetaminophen (TYLENOL) 325 MG tablet Take 2 tablets (650 mg total) by mouth every 4 (four) hours as needed for Pain or Temperature greater than (100). 30 tablet 0    atorvastatin (LIPITOR) 40 MG tablet       BASAGLAR KWIKPEN U-100 INSULIN glargine 100 units/mL (3mL) SubQ pen       benzocaine-menthoL (CEPACOL SORE THROAT, TRAVIS-MEN,) 15-3.6 mg Lozg 1 lozenge by Mucous Membrane route every 3 (three) hours as needed (Sore Throat). 16 lozenge 0    benzonatate (TESSALON) 100 MG capsule Take 1 capsule (100 mg total) by mouth 3 (three) times daily as needed for Cough. 20 capsule 0    cetirizine (ZYRTEC) 10 MG tablet Take 1 tablet (10 mg total) by mouth once daily. 30 tablet 0    fenofibrate micronized (ANTARA) 43 mg capsule       fluticasone propionate (FLONASE) 50 mcg/actuation nasal spray 1 spray (50 mcg total) by Each Nostril route 2 (two) times daily as needed. 15 g 0    HYDROcodone-acetaminophen (NORCO)  mg per tablet       Lactobacillus rhamnosus GG (CULTURELLE) 10 billion cell capsule Take 1 capsule by mouth once daily.      LEVITRA 20 mg tablet       losartan (COZAAR) 50 MG tablet Take 1 tablet (50 mg total) by mouth once daily. 30 tablet 0    meloxicam (MOBIC) 15 MG tablet       metFORMIN (FORTAMET) 1,000 mg 24hr tablet Take 1,000 mg by mouth daily with breakfast.      metFORMIN (GLUCOPHAGE) 500 MG tablet       methocarbamol (ROBAXIN) 500 MG Tab Take 2 tablets (1,000 mg total) by mouth 3 (three) times daily as needed (Muscle Spasm/Pain). 18 tablet 0    multivitamin (THERAGRAN) per tablet Take 1 tablet by mouth.      VITAMIN D2 1,250 mcg (50,000 unit) capsule       zolpidem (AMBIEN) 5 MG Tab        No current facility-administered medications on file prior to visit.        Review of Systems  Review of Systems   Eyes: Negative for double vision.   Respiratory: Negative for cough.    Cardiovascular: Negative for  "palpitations, claudication (used to get charley horse, improved on potassium pills leg crampings sometimes happens at night) and leg swelling.   Musculoskeletal: Negative for falls.   Neurological: Positive for dizziness. Negative for sensory change and headaches (every now and then , back of head. had slight headach after epley manuever).        Social History   reports that he quit smoking about 10 years ago. He has never used smokeless tobacco. He reports current alcohol use of about 1.0 standard drinks of alcohol per week. He reports that he does not use drugs.     Family History  Brother with migraines and sickle cell anemia  No heart attack or stroke.    Physical Exam   Vitals:    08/04/20 0914   BP: 120/80   Temp: 98.1 °F (36.7 °C)    Body mass index is 28.43 kg/m².  Weight: 77.5 kg (170 lb 13.7 oz)   Height: 5' 5" (165.1 cm)     GENERAL: alert, no acute distress.  HEAD: normocephalic.   SKIN: no lesions of skin of head and neck area.  EYES: lids and lashes normal. Pupils equal and reactive to light. Extraocular motions intact. No proptosis. No nystagmus at rest.  EARS: external ear without lesion, normal pinna shape and position.  External auditory canal with normal cerumen, tympanic membrane fully visible, no perforation , mild retraction bilateral. No middle ear effusion. Ossicles intact.   NOSE: external nose without abnormality, septum grossly midline on anterior rhinoscopy, mild erythema and edema of turbinates.  ORAL CAVITY/OROPHARYNX: dentition fair- upper plate/partial denture, no mass or lesion of gingiva/buccal mucosa/floor of mouth/tongue. tongue midline and mobile. No fasciculations. Floor of mouth and base of tongue soft to palpation. Symmetric palate rise. Uvula midline.   NECK: trachea midline. No discrete palpable thyroid nodule. No parotid mass nor tenderness. No submandibular gland mass nor tenderness. No scars.  LYMPH NODES:No cervical lymphadenopathy.  RESPIRATORY: no stridor, no stertor. " Voice normal. Respirations nonlabored.  NEURO: alert, responds to questions appropriately.    danae hallpike  Did not show nystagmus , pt did note subjective dizziness when laid down on the right and left sides.  Pt did not note subjective dizziness with sitting up on both sides.  Dizziness noted almost immediately upon lying down with no delay. epley maneuver done on right side  PSYCH:mood appropriate    Imaging:  The patient does have recent imaging. MRI brain from 6-2020 showed no hydrocephalus, normal IAC and 7th/8th nerve. No lesions in cerbellum.   Impacted left maxilary sinus and mild edema of right frontal sinus    Labs:  No labs in past 3 months    Assessment  1. Dizziness    2. Sensorineural hearing loss (SNHL) of both ears       Plan:  Discussed plan of care with patient in detail and all questions answered. Patient reported understanding of plan of care.     Continue using flonase and saline. He is asymptomatic regarding any sinus symptoms so perhaps this is postviral edema. Will consider getting follow up CT sinus scan in the future to ensure resolution    Ibuprofen rx'd for posterior headaches associated with dizzy episodes- episode length is not consistent with vestibular migraine but could consider in differential- he mentioned the headaches after the visit as he was leaving the office so I will discuss this further with him when he comes for follow up    Will have him see Dr. Reed one additional time in case another epley maneuver needs to be done. I will see him after this( in 2-3 weeks) . If still with dizziness, will refer to vestibular rehab.     I do not think the incidental findings of sinus disease are the reason for his dizzy symptoms and I questioned him extensively about any sinonasal symptoms and this was negative.

## 2020-08-11 ENCOUNTER — CLINICAL SUPPORT (OUTPATIENT)
Dept: AUDIOLOGY | Facility: CLINIC | Age: 60
End: 2020-08-11
Payer: MEDICAID

## 2020-08-11 DIAGNOSIS — H81.12 BPPV (BENIGN PAROXYSMAL POSITIONAL VERTIGO), LEFT: Primary | ICD-10-CM

## 2020-08-11 PROCEDURE — 95992 PR CANALITH REPOSITIONING PROCEDURE, PER DAY: ICD-10-PCS | Mod: S$GLB,,, | Performed by: AUDIOLOGIST

## 2020-08-11 PROCEDURE — 95992 CANALITH REPOSITIONING PROC: CPT | Mod: S$GLB,,, | Performed by: AUDIOLOGIST

## 2020-08-11 NOTE — PROGRESS NOTES
Juice Sutehrland was seen today for a [place]-Hallpike for positional vertigo.  He reported feeling a little better after his last Epley maneuver 5/28/2020.  He had a VNG done at [facility] 6/23/2020 and was negative for BPPV at that time.  He reported that his vertigo symptoms have gotten worse over the past few weeks.    The [place]-Hallpike was positive for BPPV to the left.  An Epley maneuver was completed to the left.      Recommendations:  1. Return in one week to check for BPPV   2. If still dizzy once BPPV resolves, consider Vestibular Rehab Therapy

## 2020-08-18 ENCOUNTER — TELEPHONE (OUTPATIENT)
Dept: OTOLARYNGOLOGY | Facility: CLINIC | Age: 60
End: 2020-08-18

## 2020-08-18 NOTE — TELEPHONE ENCOUNTER
----- Message from Naida Kothari sent at 8/18/2020  2:11 PM CDT -----  Type: Patient Call Back       What is the request in detail:  pt calling to speak to a nurse regarding if he can still come to appt.      Can the clinic reply by [facility]? No       Would the patient rather a call back or a response via [facility]? Call back       Best call back number: [phone]

## 2020-08-18 NOTE — TELEPHONE ENCOUNTER
Called Patient and he had missed his appointment for this morning and was in the area and wanting to know if Madeline can work him in for the Epley this afternoon.  Madeline is booked with Patients and I scheduled him for Friday because she said that she could not work him in today.

## 2020-08-21 ENCOUNTER — CLINICAL SUPPORT (OUTPATIENT)
Dept: AUDIOLOGY | Facility: CLINIC | Age: 60
End: 2020-08-21
Payer: MEDICAID

## 2020-08-21 DIAGNOSIS — R42 DIZZINESS AND GIDDINESS: Primary | ICD-10-CM

## 2020-08-21 PROCEDURE — 99499 UNLISTED E&M SERVICE: CPT | Mod: S$GLB,,, | Performed by: AUDIOLOGIST

## 2020-08-21 PROCEDURE — 99499 NO LOS: ICD-10-PCS | Mod: S$GLB,,, | Performed by: AUDIOLOGIST

## 2020-08-21 NOTE — PROGRESS NOTES
Juice Sutherland was seen today for a follow up [place]-Hallpike/Epley.  He reported feeling much better since his Epley maneuver and has not experienced anymore dizziness.  The [place]-Hallpike was negative for BPPV to both sides.  He will follow up with Dr. Allen 8/25/20 and will call Audiology if symptoms recur.

## 2020-08-25 ENCOUNTER — OFFICE VISIT (OUTPATIENT)
Dept: OTOLARYNGOLOGY | Facility: CLINIC | Age: 60
End: 2020-08-25
Payer: MEDICAID

## 2020-08-25 VITALS
HEIGHT: 65 IN | TEMPERATURE: 98 F | WEIGHT: 174.69 LBS | DIASTOLIC BLOOD PRESSURE: 80 MMHG | BODY MASS INDEX: 29.11 KG/M2 | SYSTOLIC BLOOD PRESSURE: 140 MMHG

## 2020-08-25 DIAGNOSIS — R42 DIZZINESS: Primary | ICD-10-CM

## 2020-08-25 PROCEDURE — 99214 OFFICE O/P EST MOD 30 MIN: CPT | Mod: S$GLB,,, | Performed by: OTOLARYNGOLOGY

## 2020-08-25 PROCEDURE — 99214 PR OFFICE/OUTPT VISIT, EST, LEVL IV, 30-39 MIN: ICD-10-PCS | Mod: S$GLB,,, | Performed by: OTOLARYNGOLOGY

## 2020-08-25 RX ORDER — FLUTICASONE PROPIONATE 50 MCG
1 SPRAY, SUSPENSION (ML) NASAL 2 TIMES DAILY
Qty: 9.9 ML | Refills: 3 | Status: SHIPPED | OUTPATIENT
Start: 2020-08-25 | End: 2020-08-25

## 2020-08-25 RX ORDER — FLUTICASONE PROPIONATE 50 MCG
1 SPRAY, SUSPENSION (ML) NASAL DAILY
Qty: 9.9 ML | Refills: 3 | Status: SHIPPED | OUTPATIENT
Start: 2020-08-25

## 2020-08-25 RX ORDER — AZELASTINE 1 MG/ML
1 SPRAY, METERED NASAL 2 TIMES DAILY
Qty: 30 ML | Refills: 3 | Status: SHIPPED | OUTPATIENT
Start: 2020-08-25 | End: 2020-08-25

## 2020-08-25 RX ORDER — AZELASTINE 1 MG/ML
1 SPRAY, METERED NASAL DAILY
Qty: 30 ML | Refills: 3 | Status: SHIPPED | OUTPATIENT
Start: 2020-08-25

## 2020-08-25 NOTE — PROGRESS NOTES
OTOLARYNGOLOGY CLINIC NOTE  Date:  08/25/2020     Chief complaint:  Chief Complaint   Patient presents with    Follow-up    Dizziness       History of Present Illness  Juice Sutherland is a 59 y.o. male  presenting today for a follow up of dizziness.he has been feeling much better since last epley was done. He did have some slight symptoms 2 days ago but much less intense than before.     He was initially seen here 6-8-20 for dizziness with following history related to the dizziness as follows:   VNG which was concerning for a possible right sided vestibular weakness. Prior Epley maneuver and findings suggestive of BPPV were noted on the left side at visit before he had the VNG. He is stil having issues with dizziness.  Last about 10-15 seconds and occurs with lying back /head movement. Still describing episodes that when lays down has dizziness, everything turns upside down. Occurs with  leaning head back or with laying flat. Occurs with sexual activity ( because of laying flat- does not seem to happen with other straining type of events- I.e. not with coughing or BM). He does not notice dizziness with change in altitude - he climbs on towers for work sometimes and has not had issue. No vomiting. Feels off after the episode and gets posterior/occipital type headaches. He has had hearing loss since around mid20's 30s.     Hearing test at prior visit 5-28-20 showed mild SNHL at 4 Khz ( slightlly worse on the left) . MRI was negative for retrocochlear pathology therefore suspect that 4KHz loss is due to  noise exposure.  Prior cranial nerve exam and cerebellar testing has been normal.    He had COVID and had ansomia and decreased taste, has been improving.No postnasal drainage no congestion . Was on flonase and zyrtec for covid ; no history of seasonal allergies. His mri also showed a micheline bullosa and left maxillary and right frontal mucosal edema.    Past Medical History  Past Medical History:   Diagnosis Date     Diabetes mellitus     Hyperlipemia     Hypertension         Past Surgical History  Past Surgical History:   Procedure Laterality Date    FRACTURE SURGERY- leg , age 16      rotator cuff surgery    Medications  Current Outpatient Medications on File Prior to Visit   Medication Sig Dispense Refill    acetaminophen (TYLENOL) 325 MG tablet Take 2 tablets (650 mg total) by mouth every 4 (four) hours as needed for Pain or Temperature greater than (100). 30 tablet 0    atorvastatin (LIPITOR) 40 MG tablet       BASAGLAR KWIKPEN U-100 INSULIN glargine 100 units/mL (3mL) SubQ pen       benzocaine-menthoL (CEPACOL SORE THROAT, TRAVIS-MEN,) 15-3.6 mg Lozg 1 lozenge by Mucous Membrane route every 3 (three) hours as needed (Sore Throat). 16 lozenge 0    benzonatate (TESSALON) 100 MG capsule Take 1 capsule (100 mg total) by mouth 3 (three) times daily as needed for Cough. 20 capsule 0    cetirizine (ZYRTEC) 10 MG tablet Take 1 tablet (10 mg total) by mouth once daily. 30 tablet 0    fenofibrate micronized (ANTARA) 43 mg capsule       fluticasone propionate (FLONASE) 50 mcg/actuation nasal spray 1 spray (50 mcg total) by Each Nostril route 2 (two) times daily as needed. 15 g 0    HYDROcodone-acetaminophen (NORCO)  mg per tablet       ibuprofen (ADVIL,MOTRIN) 600 MG tablet Take 1 tablet (600 mg total) by mouth 2 (two) times daily as needed for Pain. 30 tablet 0    Lactobacillus rhamnosus GG (CULTURELLE) 10 billion cell capsule Take 1 capsule by mouth once daily.      LEVITRA 20 mg tablet       losartan (COZAAR) 50 MG tablet Take 1 tablet (50 mg total) by mouth once daily. 30 tablet 0    metFORMIN (FORTAMET) 1,000 mg 24hr tablet Take 1,000 mg by mouth daily with breakfast.      metFORMIN (GLUCOPHAGE) 500 MG tablet       methocarbamol (ROBAXIN) 500 MG Tab Take 2 tablets (1,000 mg total) by mouth 3 (three) times daily as needed (Muscle Spasm/Pain). 18 tablet 0    multivitamin (THERAGRAN) per tablet Take 1 tablet  "by mouth.      VITAMIN D2 1,250 mcg (50,000 unit) capsule       zolpidem (AMBIEN) 5 MG Tab        No current facility-administered medications on file prior to visit.        Review of Systems  Review of Systems   Eyes: Negative for double vision.   Respiratory: Negative for cough.    Cardiovascular: Negative for palpitations, claudication (used to get charley horse, improved on potassium pills leg crampings sometimes happens at night) and leg swelling.   Musculoskeletal: Negative for falls.   Neurological: Positive for dizziness. Negative for sensory change and headaches (every now and then , back of head. had slight headach after epley manuever).        Social History   reports that he quit smoking about 10 years ago. He has never used smokeless tobacco. He reports current alcohol use of about 1.0 standard drinks of alcohol per week. He reports that he does not use drugs.     Family History  Brother with migraines and sickle cell anemia  No heart attack or stroke.    Physical Exam   Vitals:    08/25/20 0918   BP: (!) 140/80   Temp: 98.1 °F (36.7 °C)    Body mass index is 29.07 kg/m².  Weight: 79.2 kg (174 lb 11.4 oz)   Height: 5' 5" (165.1 cm)     GENERAL: no acute distress.  HEAD: normocephalic.   SKIN: no lesions of skin of head and neck area.  EYES: lids and lashes normal. Pupils equal  No proptosis. No nystagmus at rest.  EARS: external ear without lesion, normal pinna shape and position.  External auditory canal with normal cerumen, tympanic membrane fully visible, no perforation , mild retraction bilateral. No middle ear effusion. Ossicles intact.   NOSE: external nose without abnormality, septum grossly midline on anterior rhinoscopy, mild erythema and edema of turbinates.  ORAL CAVITY/OROPHARYNX:  Symmetric palate rise. Uvula midline.   NECK: trachea midline.   LYMPH NODES:No cervical lymphadenopathy.  RESPIRATORY: no stridor, no stertor. Voice normal. Respirations nonlabored.  NEURO: alert, responds to " questions appropriately.   PSYCH:mood appropriate    Labs:  No labs    Assessment  1. Dizziness  - Ambulatory referral/consult to Physical/Occupational Therapy; Future       Plan:  Discussed plan of care with patient in detail and all questions answered. Patient reported understanding of plan of care.      He is asymptomatic regarding any sinus symptoms so perhaps this is postviral edema. Will consider getting follow up CT sinus scan in the future to ensure resolution- Will see him back after he sees pulmonology for cough issues. He has no nasal symptoms .has not been on nasal sprays . Will start on astelin,  nasal saline and flonase and if pulm w/u unrevealing for etiology of cough, will scope and repeat ct scan after medical therapy trial.    dizziness is essentially resolved but still having some anxiety and intermittent perception of dizziness- will refer to vestib rehab

## 2020-09-15 ENCOUNTER — DOCUMENTATION ONLY (OUTPATIENT)
Dept: REHABILITATION | Facility: HOSPITAL | Age: 60
End: 2020-09-15

## 2020-09-15 NOTE — PROGRESS NOTES
Missed Visit/Cancellation      Date: 9/15/2020         Canceled Number: 0  No Show Number: 1                                                                                                                Pt initially had visit scheduled for today for 1530.   Reason for cancellation: no show.    Pt's PT evaluation has not been rescheduled.    Neelima San,DPT  9/15/2020

## 2020-11-25 ENCOUNTER — OFFICE VISIT (OUTPATIENT)
Dept: OTOLARYNGOLOGY | Facility: CLINIC | Age: 60
End: 2020-11-25
Payer: MEDICAID

## 2020-11-25 VITALS
WEIGHT: 174.38 LBS | HEIGHT: 65 IN | TEMPERATURE: 98 F | DIASTOLIC BLOOD PRESSURE: 80 MMHG | BODY MASS INDEX: 29.05 KG/M2 | SYSTOLIC BLOOD PRESSURE: 130 MMHG

## 2020-11-25 DIAGNOSIS — H81.11 BENIGN PAROXYSMAL POSITIONAL VERTIGO OF RIGHT EAR: Primary | ICD-10-CM

## 2020-11-25 PROCEDURE — 99213 OFFICE O/P EST LOW 20 MIN: CPT | Mod: S$GLB,,, | Performed by: OTOLARYNGOLOGY

## 2020-11-25 PROCEDURE — 99213 PR OFFICE/OUTPT VISIT, EST, LEVL III, 20-29 MIN: ICD-10-PCS | Mod: S$GLB,,, | Performed by: OTOLARYNGOLOGY

## 2020-11-25 NOTE — PROGRESS NOTES
OTOLARYNGOLOGY CLINIC NOTE  Date:  11/25/2020     Chief complaint:  Chief Complaint   Patient presents with    Dizziness       History of Present Illness  Juice Sutherland is a 60 y.o. male  presenting today for a followup of dizziness. He had been doing well but symptoms have started to come back.-gradually came back starting in about September. Started off with just feeling queezy and now room spinning happening again He is having room spinning again when laying down.   Lasts about 10 seconds. Only when moves head back and laying in bed     Since last visit here he has seen pulm for cough and mild sob. 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     I originally saw the patient on 6-8 -20 . Hearing test at prior visit 5-28-20 showed mild SNHL at 4 Khz ( slightlly worse on the left) . MRI was negative for retrocochlear pathology therefore suspected that 4KHz loss is due to  noise exposure.Prior cranial nerve exam and cerebellar testing has been normal.  He  Had a VNG which was concerning for a possible right sided vestibular weakness. Prior exam findings had been suggestive of BPPV  noted on the left side at visits before he had the VNG. I had referred him to vestibular rehab as well for persistent unsteady sensation after a few epley maneuvers had been done but he cancelled appointment as he started to feel better.     Past Medical History  Past Medical History:   Diagnosis Date    Diabetes mellitus     Hyperlipemia     Hypertension         Past Surgical History  Past Surgical History:   Procedure Laterality Date    FRACTURE SURGERY          Medications  Current Outpatient Medications on File Prior to Visit   Medication Sig Dispense Refill    acetaminophen (TYLENOL) 325 MG tablet Take 2 tablets (650 mg total) by mouth every 4 (four) hours as needed for Pain or Temperature greater than (100). 30 tablet 0    atorvastatin (LIPITOR) 40 MG tablet       azelastine (ASTELIN) 137 mcg  (0.1 %) nasal spray 1 spray (137 mcg total) by Nasal route once daily. 30 mL 3    BASAGLAR KWIKPEN U-100 INSULIN glargine 100 units/mL (3mL) SubQ pen       benzocaine-menthoL (CEPACOL SORE THROAT, TRAVIS-MEN,) 15-3.6 mg Lozg 1 lozenge by Mucous Membrane route every 3 (three) hours as needed (Sore Throat). 16 lozenge 0    benzonatate (TESSALON) 100 MG capsule Take 1 capsule (100 mg total) by mouth 3 (three) times daily as needed for Cough. 20 capsule 0    cetirizine (ZYRTEC) 10 MG tablet Take 1 tablet (10 mg total) by mouth once daily. 30 tablet 0    fenofibrate micronized (ANTARA) 43 mg capsule       fluticasone propionate (FLONASE) 50 mcg/actuation nasal spray 1 spray (50 mcg total) by Each Nostril route 2 (two) times daily as needed. 15 g 0    fluticasone propionate (FLONASE) 50 mcg/actuation nasal spray 1 spray (50 mcg total) by Each Nostril route once daily. 9.9 mL 3    HYDROcodone-acetaminophen (NORCO)  mg per tablet       ibuprofen (ADVIL,MOTRIN) 600 MG tablet Take 1 tablet (600 mg total) by mouth 2 (two) times daily as needed for Pain. 30 tablet 0    Lactobacillus rhamnosus GG (CULTURELLE) 10 billion cell capsule Take 1 capsule by mouth once daily.      LEVITRA 20 mg tablet       losartan (COZAAR) 50 MG tablet Take 1 tablet (50 mg total) by mouth once daily. 30 tablet 0    metFORMIN (FORTAMET) 1,000 mg 24hr tablet Take 1,000 mg by mouth daily with breakfast.      metFORMIN (GLUCOPHAGE) 500 MG tablet       methocarbamol (ROBAXIN) 500 MG Tab Take 2 tablets (1,000 mg total) by mouth 3 (three) times daily as needed (Muscle Spasm/Pain). 18 tablet 0    multivitamin (THERAGRAN) per tablet Take 1 tablet by mouth.      VITAMIN D2 1,250 mcg (50,000 unit) capsule       zolpidem (AMBIEN) 5 MG Tab        No current facility-administered medications on file prior to visit.        Review of Systems  Review of Systems   Constitutional: Negative for fever.   HENT: Negative for congestion.    Eyes:  "Negative for double vision.   Neurological: Positive for dizziness. Negative for sensory change, focal weakness and headaches.        Social History   reports that he quit smoking about 10 years ago. He has never used smokeless tobacco. He reports current alcohol use of about 1.0 standard drinks of alcohol per week. He reports that he does not use drugs.     Family History  No family history on file.     Physical Exam   Vitals:    11/25/20 0911   BP: 130/80   Temp: 98.3 °F (36.8 °C)    Body mass index is 29.02 kg/m².  Weight: 79.1 kg (174 lb 6.1 oz)   Height: 5' 5" (165.1 cm)     GENERAL: no acute distress.  HEAD: normocephalic.   EYES: lids and lashes normal. Pupils equal . No proptosis  EARS: external ear without lesion, normal pinna shape and position.  External auditory canal with normal cerumen, tympanic membrane fully visible, no perforation , no retraction. No middle ear effusion. Ossicles intact.   NOSE: external nose without abnormality  ORAL CAVITY/OROPHARYNX: tongue midline and mobile. Symmetric palate rise. Uvula midline.   NECK: trachea midline.   LYMPH NODES:No cervical lymphadenopathy.  RESPIRATORY: no stridor, no stertor. Voice normal. Respirations nonlabored.  NEURO: alert, responds to questions appropriately.  Facial movement symmetric at rest . Had subjective dizziness when laying down on right and left side with danae hallpike. Possible subtle nystagmus on left side, had more significant nystagmus ( although not overt findings of rotary ageotropic nystagmus) and more subjective dizziness when laid to right. epley maneuver performed to right.  PSYCH:mood appropriate      Imaging:  The patient does not have any new imaging of the head and neck since last visit.     Labs:  CBC  Recent Labs   Lab 03/16/20  1700 03/18/20  0636   WBC 5.94 3.65 L   Hemoglobin 14.8 13.8 L   Hematocrit 46.4 40.8   MCV 88 85   Platelets 201 261     BMP  Recent Labs   Lab 03/16/20  1818 03/18/20  0636 03/19/20  0509 " 06/16/20  1456   Glucose 98 95 88  --    Sodium 137 137 137  --    Potassium 3.7 4.0 3.9  --    Chloride 104 103 105  --    CO2 18 L 21 L 20 L  --    BUN 20 22 H 15  --    Creatinine 1.1 1.3 0.9 1.2   Calcium 8.5 L 8.4 L 8.1 L  --      COAGS        Assessment  1. Benign paroxysmal positional vertigo of right ear       Plan:  Discussed plan of care with patient in detail and all questions answered. Patient reported understanding of plan of care.   Pt did not subjective dizziness immediately when laying down. We discussed that I was concerned about possible neurologic reason as BPPV is delayed onset. Upon further discussion, he said that he thinks it was actually a couple of seconds after I laid him flat. I recommended neuro eval but he would like to hold off for now.     I will have him follow up with Madeline my audiologist in 1 week to see if repeat epley needed. I will see him back in 2 weeks and if he is not better will rediscuss possible neuro consult +/- vestibular rehab.

## 2020-12-02 ENCOUNTER — CLINICAL SUPPORT (OUTPATIENT)
Dept: AUDIOLOGY | Facility: CLINIC | Age: 60
End: 2020-12-02
Payer: MEDICAID

## 2020-12-02 DIAGNOSIS — H81.12 BPPV (BENIGN PAROXYSMAL POSITIONAL VERTIGO), LEFT: Primary | ICD-10-CM

## 2020-12-02 PROCEDURE — 95992 PR CANALITH REPOSITIONING PROCEDURE, PER DAY: ICD-10-PCS | Mod: S$GLB,,, | Performed by: AUDIOLOGIST

## 2020-12-02 PROCEDURE — 95992 CANALITH REPOSITIONING PROC: CPT | Mod: S$GLB,,, | Performed by: AUDIOLOGIST

## 2020-12-02 NOTE — PROGRESS NOTES
Juice Sutherland was seen today for a follow up for BPPV.  He was seen 11/25/20 by Dr. Allen and was positive for BPPV to the right side and an Epley maneuver was completed to the right.  Today, the Pledger-Hallpike was positive for BPPV to the left side and an Epley maneuver was completed to the left.  A one week follow up appt was scheduled with me for a repeat Epley if needed.  He also has a follow up appt scheduled with Dr. Allen next week.

## 2020-12-04 NOTE — PATIENT INSTRUCTIONS
"Information and instructions from your visit with me today:    · Start using the following medication nasal sprays:   · Fluticasone spray:    This medication is a steroid spray. It stays within the nose and does not have absorption into the body that leads to side effects that one has with oral steroid medication. Fluticasone nasal spray is the same as the Flonase brand nasal spray. Discuss with your pharmacist if the price is lower over the counter or with a prescription ( this varies depending on insurance). The medication that is over the counter is the same as the prescription medication. Use this medication as instructed on the prescription, 1-2 sprays on each side of your nose once daily.     · Azelastine  spray:  This medication is an antihistamine used to treat nasal symptoms of allergy, which works specifically in the nose unlike antihistamine pills which have more of an effect on the whole body. Use this medication as instructed on the prescription, 1 spray on each side of your nose once daily.     Additional instructions for medication sprays  Place the tip of the medication bottle in your nose and aim slightly up and out on each side to get medication high and deep into your nose and sinuses, and not have it all deposit in the very front of your nose. Aim the tip of the nozzle towards the outer corner of your eye . You can imagine aiming towards the back of your eyeball on each side for this, as opposed to straight back to the center of your nose and head.     You need to use this medication every day regardless of symptoms, as it takes time ( a few weeks) to work and get the benefits. It does not work on an "as needed" basis like taking a decongestant. If your symptoms only occur in a particular season, then the medication can be used seasonally instead of year long. For seasonal symptoms, you should start using the spray twice daily a month before when you normally have symptoms ( for example, if " R hand pain radiating to shoulder symptoms start in August, should start at the end of June).     · Start nasal irrigations with saline solution- you can either use a rinse or a mist spray:    SALINE SINUS RINSE ([employer] brand): You should do a full bottle, half on one side of your nose and half on the other, 1-2 times per day (or more if able to, you cannot do this too much). Follow the instructions on the box: mix the salt packet with clean water (bottle, previously boiled, distilled, etc -- not tap water) to the line on the bottle to make the irrigation.         NASAL SALINE SPRAY ( simply saline and arm and hammer are examples) There are several different brands found in the cold and flu aisle of the pharmacy. You can use any brand of saline spray - this will deliver the saline by a gentle mist ( if you have difficulty or discomfort with nasal rinse/ a lot of fluid in the nose, this will be more comfortable).     Always rinse your nose with saline prior to using medication sprays and wait a couple of hours before using again. You can use the saline throughout the day to help with stuffy nose or dry nose.    · Do not use nasal decongestant sprays such as Afrin or similar products long term ( over 3 days) .  This can cause long term physical nasal addiction. Afrin should only be used if having nose bleeds, severe nasal congestion , or severe ear pain/fullness and should not be used for more than 2-3 days in a row . It is a not a medication that should be used for a long period of time.     It was nice meeting you today, and I look forward to helping you feel better soon. Please don't hesitate to call if you have any other questions or concerns, or if I can be of any assistance in the meantime.      Namrata Allen MD    [facility]     Phone  [phone]    Fax      [phone]        Namrata Allen MD  Otorhinolaryngology

## 2020-12-11 ENCOUNTER — OFFICE VISIT (OUTPATIENT)
Dept: OTOLARYNGOLOGY | Facility: CLINIC | Age: 60
End: 2020-12-11
Payer: MEDICAID

## 2020-12-11 VITALS
BODY MASS INDEX: 28.61 KG/M2 | DIASTOLIC BLOOD PRESSURE: 60 MMHG | TEMPERATURE: 98 F | HEIGHT: 65 IN | WEIGHT: 171.75 LBS | SYSTOLIC BLOOD PRESSURE: 120 MMHG

## 2020-12-11 DIAGNOSIS — H81.13 BENIGN PAROXYSMAL POSITIONAL VERTIGO DUE TO BILATERAL VESTIBULAR DISORDER: ICD-10-CM

## 2020-12-11 DIAGNOSIS — R42 DIZZINESS: Primary | ICD-10-CM

## 2020-12-11 PROCEDURE — 99213 OFFICE O/P EST LOW 20 MIN: CPT | Mod: S$GLB,,, | Performed by: OTOLARYNGOLOGY

## 2020-12-11 PROCEDURE — 99213 PR OFFICE/OUTPT VISIT, EST, LEVL III, 20-29 MIN: ICD-10-PCS | Mod: S$GLB,,, | Performed by: OTOLARYNGOLOGY

## 2020-12-11 NOTE — PROGRESS NOTES
OTOLARYNGOLOGY CLINIC NOTE  Date:  12/11/2020     Chief complaint:  Chief Complaint   Patient presents with    Follow-up    Dizziness       History of Present Illness  Juice Sutherland is a 60 y.o. male  presenting today for a followup of dizziness  Had another repeat epley maneuver  On the left with anh since last visit with me.  This was on December 2nd.Had done ok but feels like symptoms are starting to come back again . When laid back got a bit dizzy a couple of times.     I originally saw the patient on 6-8 -20 . Hearing test at prior visit 5-28-20 showed mild SNHL at 4 Khz ( slightlly worse on the left) . MRI was negative for retrocochlear pathology therefore suspected that 4KHz loss is due to  noise exposure.Prior cranial nerve exam and cerebellar testing has been normal.  He  Had a VNG which was concerning for a possible right sided vestibular weakness. Prior exam findings had been suggestive of BPPV  noted on the left side at visits before he had the VNG. I had referred him to vestibular rehab as well for persistent unsteady sensation after a few epley maneuvers had been done but he cancelled appointment as he started to feel better.     He had been doing well but symptoms started to come back.-gradually came back starting in about September. Started off with just feeling queezy and then room spinning started happening again . I last saw him 11-25-20 and he had a positive danae hallpike on the the right an Epley maneuver was performed at that time.      Past Medical History  Past Medical History:   Diagnosis Date    Diabetes mellitus     Hyperlipemia     Hypertension         Past Surgical History  Past Surgical History:   Procedure Laterality Date    FRACTURE SURGERY          Medications  Current Outpatient Medications on File Prior to Visit   Medication Sig Dispense Refill    acetaminophen (TYLENOL) 325 MG tablet Take 2 tablets (650 mg total) by mouth every 4 (four) hours as needed for Pain or  Temperature greater than (100). 30 tablet 0    atorvastatin (LIPITOR) 40 MG tablet       azelastine (ASTELIN) 137 mcg (0.1 %) nasal spray 1 spray (137 mcg total) by Nasal route once daily. 30 mL 3    BASAGLAR KWIKPEN U-100 INSULIN glargine 100 units/mL (3mL) SubQ pen       benzocaine-menthoL (CEPACOL SORE THROAT, TRAVIS-MEN,) 15-3.6 mg Lozg 1 lozenge by Mucous Membrane route every 3 (three) hours as needed (Sore Throat). 16 lozenge 0    benzonatate (TESSALON) 100 MG capsule Take 1 capsule (100 mg total) by mouth 3 (three) times daily as needed for Cough. 20 capsule 0    cetirizine (ZYRTEC) 10 MG tablet Take 1 tablet (10 mg total) by mouth once daily. 30 tablet 0    fenofibrate micronized (ANTARA) 43 mg capsule       fluticasone propionate (FLONASE) 50 mcg/actuation nasal spray 1 spray (50 mcg total) by Each Nostril route 2 (two) times daily as needed. 15 g 0    fluticasone propionate (FLONASE) 50 mcg/actuation nasal spray 1 spray (50 mcg total) by Each Nostril route once daily. 9.9 mL 3    HYDROcodone-acetaminophen (NORCO)  mg per tablet       ibuprofen (ADVIL,MOTRIN) 600 MG tablet Take 1 tablet (600 mg total) by mouth 2 (two) times daily as needed for Pain. 30 tablet 0    Lactobacillus rhamnosus GG (CULTURELLE) 10 billion cell capsule Take 1 capsule by mouth once daily.      LEVITRA 20 mg tablet       losartan (COZAAR) 50 MG tablet Take 1 tablet (50 mg total) by mouth once daily. 30 tablet 0    metFORMIN (FORTAMET) 1,000 mg 24hr tablet Take 1,000 mg by mouth daily with breakfast.      metFORMIN (GLUCOPHAGE) 500 MG tablet       methocarbamol (ROBAXIN) 500 MG Tab Take 2 tablets (1,000 mg total) by mouth 3 (three) times daily as needed (Muscle Spasm/Pain). 18 tablet 0    multivitamin (THERAGRAN) per tablet Take 1 tablet by mouth.      VITAMIN D2 1,250 mcg (50,000 unit) capsule       zolpidem (AMBIEN) 5 MG Tab        No current facility-administered medications on file prior to visit.   "      Review of Systems  Review of Systems   Constitutional: Negative for fever.   HENT: Negative for tinnitus.    Eyes: Negative for double vision.   Gastrointestinal: Negative for vomiting.   Neurological: Positive for dizziness. Negative for headaches.        Social History   reports that he quit smoking about 10 years ago. He has never used smokeless tobacco. He reports current alcohol use of about 1.0 standard drinks of alcohol per week. He reports that he does not use drugs.     Family History  History reviewed. No pertinent family history.     Physical Exam   Vitals:    12/11/20 0812   BP: 120/60   Temp: 98.2 °F (36.8 °C)    Body mass index is 28.58 kg/m².  Weight: 77.9 kg (171 lb 11.8 oz)   Height: 5' 5" (165.1 cm)     GENERAL: no acute distress.  HEAD: normocephalic.   EYES: lids and lashes normal. Pupils equal . No proptosis  EARS: external ear without lesion, normal pinna shape and position.  External auditory canal with normal cerumen, tympanic membrane fully visible, no perforation , no retraction. No middle ear effusion. Ossicles intact.   NOSE: external nose without abnormality  ORAL CAVITY/OROPHARYNX: tongue midline and mobile. Symmetric palate rise. Uvula midline.   NECK: trachea midline.   LYMPH NODES:No cervical lymphadenopathy.  RESPIRATORY: no stridor, no stertor. Voice normal. Respirations nonlabored.  NEURO: alert, responds to questions appropriately.  Facial movement symmetric at rest Dwight hallpike negative but had short sensation of  light headed feeling subjectively with sitting up.   PSYCH:mood appropriate      Imaging:  The patient does not have any new imaging of the head and neck since last visit.     Labs:  CBC  Recent Labs   Lab 03/16/20  1700 03/18/20  0636   WBC 5.94 3.65 L   Hemoglobin 14.8 13.8 L   Hematocrit 46.4 40.8   MCV 88 85   Platelets 201 261     BMP  Recent Labs   Lab 03/16/20  1818 03/18/20  0636 03/19/20  0509 06/16/20  1456   Glucose 98 95 88  --    Sodium 137 137 137  " --    Potassium 3.7 4.0 3.9  --    Chloride 104 103 105  --    CO2 18 L 21 L 20 L  --    BUN 20 22 H 15  --    Creatinine 1.1 1.3 0.9 1.2   Calcium 8.5 L 8.4 L 8.1 L  --      COAGS        Assessment  1. Dizziness  - Ambulatory referral/consult to Physical/Occupational Therapy; Future  - Ambulatory referral/consult to Neurology; Future    2. Benign paroxysmal positional vertigo due to bilateral vestibular disorder       Plan:  Discussed plan of care with patient in detail and all questions answered. Patient reported understanding of plan of care.   Wants to see anh again for possible epley. His dwight hallpike today was negative so I did not perform an epley maneuver. I will add him onto audiology schedule to see if dwight hallpike at another time point may show results     I recommended that he also seen Neurology.  At last visit there was a bit of confusion as to whether or not his symptoms started immediately during the Dwight-Hallpike afterwards when I was discussing this with him he corrected his original statement when I was doing the Dwight-Hallpike that actually his dizziness was not immediate in onset.  I would like to get another opinion to ensure no additional pathology present.  We discussed that it is possible that he has persistent BPPV.  He may also have persistent posture perceptual dizziness in which case a similar rehabilitation would be helpful to have recommended that he undergo rehab.  as well may be PPPD refer to rehab       Follow-up in 2 months after completed vestibular rehab.

## 2020-12-16 ENCOUNTER — TELEPHONE (OUTPATIENT)
Dept: OTOLARYNGOLOGY | Facility: CLINIC | Age: 60
End: 2020-12-16

## 2020-12-16 NOTE — TELEPHONE ENCOUNTER
----- Message from Christiana Ashraf sent at 12/16/2020  9:38 AM CST -----  Type: Patient Call Back    Who called: Self     What is the request in detail: patient says he was referred to a Neurologist and need to know when and where he should go for his appointment. Please call     Can the clinic reply by [facility]? No     Would the patient rather a call back or a response via [facility]?  Call     Best call back number:.[phone] (home)

## 2021-01-05 ENCOUNTER — CLINICAL SUPPORT (OUTPATIENT)
Dept: REHABILITATION | Facility: HOSPITAL | Age: 61
End: 2021-01-05
Attending: OTOLARYNGOLOGY
Payer: MEDICAID

## 2021-01-05 DIAGNOSIS — R68.89 DECREASED ACTIVITY TOLERANCE: ICD-10-CM

## 2021-01-05 DIAGNOSIS — R42 DIZZINESS: ICD-10-CM

## 2021-01-05 PROCEDURE — 97161 PT EVAL LOW COMPLEX 20 MIN: CPT | Mod: PN

## 2021-01-06 ENCOUNTER — PATIENT MESSAGE (OUTPATIENT)
Dept: OTOLARYNGOLOGY | Facility: CLINIC | Age: 61
End: 2021-01-06

## 2021-01-08 PROBLEM — R68.89 DECREASED ACTIVITY TOLERANCE: Status: ACTIVE | Noted: 2021-01-08

## 2021-01-27 ENCOUNTER — CLINICAL SUPPORT (OUTPATIENT)
Dept: REHABILITATION | Facility: HOSPITAL | Age: 61
End: 2021-01-27
Attending: OTOLARYNGOLOGY
Payer: MEDICAID

## 2021-01-27 DIAGNOSIS — R68.89 DECREASED ACTIVITY TOLERANCE: ICD-10-CM

## 2021-01-27 PROCEDURE — 97110 THERAPEUTIC EXERCISES: CPT | Mod: PN

## 2021-02-12 ENCOUNTER — OFFICE VISIT (OUTPATIENT)
Dept: OTOLARYNGOLOGY | Facility: CLINIC | Age: 61
End: 2021-02-12
Payer: MEDICAID

## 2021-02-12 VITALS
BODY MASS INDEX: 29.37 KG/M2 | HEIGHT: 65 IN | DIASTOLIC BLOOD PRESSURE: 78 MMHG | SYSTOLIC BLOOD PRESSURE: 118 MMHG | WEIGHT: 176.25 LBS | TEMPERATURE: 98 F

## 2021-02-12 DIAGNOSIS — H81.13 BPPV (BENIGN PAROXYSMAL POSITIONAL VERTIGO), BILATERAL: ICD-10-CM

## 2021-02-12 DIAGNOSIS — R42 DIZZINESS: Primary | ICD-10-CM

## 2021-02-12 PROCEDURE — 99213 OFFICE O/P EST LOW 20 MIN: CPT | Mod: S$GLB,,, | Performed by: OTOLARYNGOLOGY

## 2021-02-12 PROCEDURE — 99213 PR OFFICE/OUTPT VISIT, EST, LEVL III, 20-29 MIN: ICD-10-PCS | Mod: S$GLB,,, | Performed by: OTOLARYNGOLOGY

## 2021-02-24 ENCOUNTER — OFFICE VISIT (OUTPATIENT)
Dept: NEUROLOGY | Facility: CLINIC | Age: 61
End: 2021-02-24
Payer: MEDICAID

## 2021-02-24 VITALS
HEART RATE: 79 BPM | SYSTOLIC BLOOD PRESSURE: 145 MMHG | WEIGHT: 173.75 LBS | BODY MASS INDEX: 28.95 KG/M2 | HEIGHT: 65 IN | DIASTOLIC BLOOD PRESSURE: 68 MMHG

## 2021-02-24 DIAGNOSIS — R42 DIZZINESS: Primary | ICD-10-CM

## 2021-02-24 PROCEDURE — 99205 PR OFFICE/OUTPT VISIT, NEW, LEVL V, 60-74 MIN: ICD-10-PCS | Mod: S$PBB,,, | Performed by: NEUROLOGICAL SURGERY

## 2021-02-24 PROCEDURE — 99999 PR PBB SHADOW E&M-EST. PATIENT-LVL IV: CPT | Mod: PBBFAC,,, | Performed by: NEUROLOGICAL SURGERY

## 2021-02-24 PROCEDURE — 99214 OFFICE O/P EST MOD 30 MIN: CPT | Mod: PBBFAC | Performed by: NEUROLOGICAL SURGERY

## 2021-02-24 PROCEDURE — 99205 OFFICE O/P NEW HI 60 MIN: CPT | Mod: S$PBB,,, | Performed by: NEUROLOGICAL SURGERY

## 2021-02-24 PROCEDURE — 99999 PR PBB SHADOW E&M-EST. PATIENT-LVL IV: ICD-10-PCS | Mod: PBBFAC,,, | Performed by: NEUROLOGICAL SURGERY

## 2021-02-24 RX ORDER — VENLAFAXINE HYDROCHLORIDE 37.5 MG/1
37.5 CAPSULE, EXTENDED RELEASE ORAL DAILY
Qty: 30 CAPSULE | Refills: 11 | Status: SHIPPED | OUTPATIENT
Start: 2021-02-24 | End: 2022-02-24

## 2021-02-26 ENCOUNTER — IMMUNIZATION (OUTPATIENT)
Dept: PRIMARY CARE CLINIC | Facility: CLINIC | Age: 61
End: 2021-02-26
Payer: MEDICAID

## 2021-02-26 DIAGNOSIS — Z23 NEED FOR VACCINATION: Primary | ICD-10-CM

## 2021-02-26 PROCEDURE — 0011A COVID-19, MRNA, LNP-S, PF, 100 MCG/0.5 ML DOSE VACCINE: CPT | Mod: PBBFAC | Performed by: EMERGENCY MEDICINE

## 2021-03-01 ENCOUNTER — PATIENT MESSAGE (OUTPATIENT)
Dept: OTOLARYNGOLOGY | Facility: CLINIC | Age: 61
End: 2021-03-01

## 2021-03-01 ENCOUNTER — CLINICAL SUPPORT (OUTPATIENT)
Dept: REHABILITATION | Facility: HOSPITAL | Age: 61
End: 2021-03-01
Attending: OTOLARYNGOLOGY
Payer: MEDICAID

## 2021-03-01 DIAGNOSIS — R68.89 DECREASED ACTIVITY TOLERANCE: ICD-10-CM

## 2021-03-01 PROCEDURE — 95992 CANALITH REPOSITIONING PROC: CPT | Mod: PN

## 2021-03-01 PROCEDURE — 97112 NEUROMUSCULAR REEDUCATION: CPT | Mod: PN

## 2021-03-08 ENCOUNTER — CLINICAL SUPPORT (OUTPATIENT)
Dept: REHABILITATION | Facility: HOSPITAL | Age: 61
End: 2021-03-08
Attending: OTOLARYNGOLOGY
Payer: MEDICAID

## 2021-03-08 DIAGNOSIS — R68.89 DECREASED ACTIVITY TOLERANCE: ICD-10-CM

## 2021-03-08 PROCEDURE — 97110 THERAPEUTIC EXERCISES: CPT | Mod: PN

## 2021-03-26 ENCOUNTER — IMMUNIZATION (OUTPATIENT)
Dept: PRIMARY CARE CLINIC | Facility: CLINIC | Age: 61
End: 2021-03-26
Payer: MEDICAID

## 2021-03-26 DIAGNOSIS — Z23 NEED FOR VACCINATION: Primary | ICD-10-CM

## 2021-03-26 PROCEDURE — 0012A COVID-19, MRNA, LNP-S, PF, 100 MCG/0.5 ML DOSE VACCINE: CPT | Mod: PBBFAC,PN

## 2021-03-30 ENCOUNTER — CLINICAL SUPPORT (OUTPATIENT)
Dept: REHABILITATION | Facility: HOSPITAL | Age: 61
End: 2021-03-30
Attending: OTOLARYNGOLOGY
Payer: MEDICAID

## 2021-03-30 DIAGNOSIS — R68.89 DECREASED ACTIVITY TOLERANCE: ICD-10-CM

## 2021-03-30 PROCEDURE — 97110 THERAPEUTIC EXERCISES: CPT | Mod: PN

## 2021-12-02 ENCOUNTER — IMMUNIZATION (OUTPATIENT)
Dept: PRIMARY CARE CLINIC | Facility: CLINIC | Age: 61
End: 2021-12-02
Payer: MEDICAID

## 2021-12-02 DIAGNOSIS — Z23 NEED FOR VACCINATION: Primary | ICD-10-CM

## 2021-12-02 PROCEDURE — 0064A COVID-19, MRNA, LNP-S, PF, 100 MCG/0.25 ML DOSE VACCINE (MODERNA BOOSTER): CPT | Mod: PBBFAC,PN

## 2022-06-02 ENCOUNTER — IMMUNIZATION (OUTPATIENT)
Dept: PRIMARY CARE CLINIC | Facility: CLINIC | Age: 62
End: 2022-06-02
Payer: MEDICAID

## 2022-06-02 DIAGNOSIS — Z23 NEED FOR VACCINATION: Primary | ICD-10-CM

## 2022-06-02 PROCEDURE — 91301 COVID-19, MRNA, LNP-S, PF, 100 MCG/0.5 ML DOSE VACCINE: CPT | Mod: PBBFAC,PN

## 2023-03-10 ENCOUNTER — OFFICE VISIT (OUTPATIENT)
Dept: PULMONOLOGY | Facility: CLINIC | Age: 63
End: 2023-03-10
Payer: MEDICARE

## 2023-03-10 ENCOUNTER — HOSPITAL ENCOUNTER (OUTPATIENT)
Dept: RADIOLOGY | Facility: HOSPITAL | Age: 63
Discharge: HOME | End: 2023-03-10
Attending: INTERNAL MEDICINE
Payer: MEDICARE

## 2023-03-10 VITALS
SYSTOLIC BLOOD PRESSURE: 115 MMHG | HEIGHT: 65 IN | BODY MASS INDEX: 27.2 KG/M2 | OXYGEN SATURATION: 97 % | DIASTOLIC BLOOD PRESSURE: 75 MMHG | WEIGHT: 163.25 LBS | HEART RATE: 67 BPM

## 2023-03-10 DIAGNOSIS — R06.09 DYSPNEA ON EXERTION: ICD-10-CM

## 2023-03-10 DIAGNOSIS — G47.33 OSA (OBSTRUCTIVE SLEEP APNEA): ICD-10-CM

## 2023-03-10 DIAGNOSIS — R06.09 DYSPNEA ON EXERTION: Primary | ICD-10-CM

## 2023-03-10 PROCEDURE — 99204 OFFICE O/P NEW MOD 45 MIN: CPT | Mod: S$GLB,,, | Performed by: INTERNAL MEDICINE

## 2023-03-10 PROCEDURE — 71046 XR CHEST PA AND LATERAL: ICD-10-PCS | Mod: 26,,, | Performed by: RADIOLOGY

## 2023-03-10 PROCEDURE — 3074F SYST BP LT 130 MM HG: CPT | Mod: CPTII,S$GLB,, | Performed by: INTERNAL MEDICINE

## 2023-03-10 PROCEDURE — 3008F PR BODY MASS INDEX (BMI) DOCUMENTED: ICD-10-PCS | Mod: CPTII,S$GLB,, | Performed by: INTERNAL MEDICINE

## 2023-03-10 PROCEDURE — 3074F PR MOST RECENT SYSTOLIC BLOOD PRESSURE < 130 MM HG: ICD-10-PCS | Mod: CPTII,S$GLB,, | Performed by: INTERNAL MEDICINE

## 2023-03-10 PROCEDURE — 4010F PR ACE/ARB THEARPY RXD/TAKEN: ICD-10-PCS | Mod: CPTII,S$GLB,, | Performed by: INTERNAL MEDICINE

## 2023-03-10 PROCEDURE — 71046 X-RAY EXAM CHEST 2 VIEWS: CPT | Mod: 26,,, | Performed by: RADIOLOGY

## 2023-03-10 PROCEDURE — 3008F BODY MASS INDEX DOCD: CPT | Mod: CPTII,S$GLB,, | Performed by: INTERNAL MEDICINE

## 2023-03-10 PROCEDURE — 99204 PR OFFICE/OUTPT VISIT, NEW, LEVL IV, 45-59 MIN: ICD-10-PCS | Mod: S$GLB,,, | Performed by: INTERNAL MEDICINE

## 2023-03-10 PROCEDURE — 3078F PR MOST RECENT DIASTOLIC BLOOD PRESSURE < 80 MM HG: ICD-10-PCS | Mod: CPTII,S$GLB,, | Performed by: INTERNAL MEDICINE

## 2023-03-10 PROCEDURE — 1159F PR MEDICATION LIST DOCUMENTED IN MEDICAL RECORD: ICD-10-PCS | Mod: CPTII,S$GLB,, | Performed by: INTERNAL MEDICINE

## 2023-03-10 PROCEDURE — 3078F DIAST BP <80 MM HG: CPT | Mod: CPTII,S$GLB,, | Performed by: INTERNAL MEDICINE

## 2023-03-10 PROCEDURE — 1159F MED LIST DOCD IN RCRD: CPT | Mod: CPTII,S$GLB,, | Performed by: INTERNAL MEDICINE

## 2023-03-10 PROCEDURE — 99999 PR PBB SHADOW E&M-EST. PATIENT-LVL V: ICD-10-PCS | Mod: PBBFAC,,, | Performed by: INTERNAL MEDICINE

## 2023-03-10 PROCEDURE — 71046 X-RAY EXAM CHEST 2 VIEWS: CPT | Mod: TC,FY

## 2023-03-10 PROCEDURE — 4010F ACE/ARB THERAPY RXD/TAKEN: CPT | Mod: CPTII,S$GLB,, | Performed by: INTERNAL MEDICINE

## 2023-03-10 PROCEDURE — 99999 PR PBB SHADOW E&M-EST. PATIENT-LVL V: CPT | Mod: PBBFAC,,, | Performed by: INTERNAL MEDICINE
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
## 2023-03-15 ENCOUNTER — HOSPITAL ENCOUNTER (OUTPATIENT)
Dept: RESPIRATORY THERAPY | Facility: HOSPITAL | Age: 63
Discharge: HOME | End: 2023-03-15
Attending: INTERNAL MEDICINE
Payer: MEDICARE

## 2023-03-15 VITALS — RESPIRATION RATE: 18 BRPM | HEART RATE: 55 BPM | OXYGEN SATURATION: 99 %

## 2023-03-15 DIAGNOSIS — R06.09 DYSPNEA ON EXERTION: ICD-10-CM

## 2023-03-15 LAB
BRPFT: NORMAL
DLCO ADJ PRE: 15.97 ML/(MIN*MMHG)
DLCO SINGLE BREATH LLN: 17.39
DLCO SINGLE BREATH PRE REF: 65.7 %
DLCO SINGLE BREATH REF: 24.32
DLCOC SBVA LLN: 2.62
DLCOC SBVA PRE REF: 85.3 %
DLCOC SBVA REF: 3.98
DLCOC SINGLE BREATH LLN: 17.39
DLCOC SINGLE BREATH PRE REF: 65.7 %
DLCOC SINGLE BREATH REF: 24.32
DLCOVA LLN: 2.62
DLCOVA PRE REF: 85.3 %
DLCOVA PRE: 3.4 ML/(MIN*MMHG*L)
DLCOVA REF: 3.98
DLVAADJ PRE: 3.4 ML/(MIN*MMHG*L)
ERVN2 LLN: -16448.98
ERVN2 PRE REF: 103.9 %
ERVN2 PRE: 1.06 L
ERVN2 REF: 1.02
FEF 25 75 CHG: -15.7 %
FEF 25 75 LLN: 0.85
FEF 25 75 POST REF: 60.8 %
FEF 25 75 PRE REF: 72.1 %
FEF 25 75 REF: 2.14
FET100 CHG: 26.5 %
FEV1 CHG: 7.4 %
FEV1 FVC CHG: 4 %
FEV1 FVC LLN: 66
FEV1 FVC POST REF: 89.2 %
FEV1 FVC PRE REF: 85.7 %
FEV1 FVC REF: 78
FEV1 LLN: 1.78
FEV1 POST REF: 97.7 %
FEV1 PRE REF: 90.9 %
FEV1 REF: 2.5
FRCN2 LLN: 2.35
FRCN2 PRE REF: 71 %
FRCN2 REF: 3.34
FVC CHG: 3.3 %
FVC LLN: 2.34
FVC POST REF: 109.7 %
FVC PRE REF: 106.2 %
FVC REF: 3.18
IVC PRE: 3.14 L
IVC SINGLE BREATH LLN: 2.34
IVC SINGLE BREATH PRE REF: 98.7 %
IVC SINGLE BREATH REF: 3.18
PEF CHG: 43.1 %
PEF LLN: 4.81
PEF POST REF: 92.8 %
PEF PRE REF: 64.8 %
PEF REF: 7.1
POST FEF 25 75: 1.3 L/S
POST FET 100: 13.58 SEC
POST FEV1 FVC: 69.97 %
POST FEV1: 2.44 L
POST FVC: 3.49 L
POST PEF: 6.58 L/S
PRE DLCO: 15.97 ML/(MIN*MMHG)
PRE FEF 25 75: 1.54 L/S
PRE FET 100: 10.74 SEC
PRE FEV1 FVC: 67.27 %
PRE FEV1: 2.27 L
PRE FRC N2: 2.37 L
PRE FVC: 3.38 L
PRE PEF: 4.6 L/S
RVN2 LLN: 1.64
RVN2 PRE REF: 56.5 %
RVN2 PRE: 1.31 L
RVN2 REF: 2.32
RVN2TLCN2 LLN: 29.55
RVN2TLCN2 PRE REF: 73.8 %
RVN2TLCN2 PRE: 28.42 %
RVN2TLCN2 REF: 38.53
TLCN2 LLN: 4.95
TLCN2 PRE REF: 75.5 %
TLCN2 PRE: 4.61 L
TLCN2 REF: 6.1
VA PRE: 4.7 L
VA SINGLE BREATH LLN: 5.95
VA SINGLE BREATH PRE REF: 78.9 %
VA SINGLE BREATH REF: 5.95
VCMAXN2 LLN: 2.34
VCMAXN2 PRE REF: 103.8 %
VCMAXN2 PRE: 3.3 L
VCMAXN2 REF: 3.18

## 2023-03-15 PROCEDURE — 94729 PR C02/MEMBANE DIFFUSE CAPACITY: ICD-10-PCS | Mod: 26,,, | Performed by: INTERNAL MEDICINE

## 2023-03-15 PROCEDURE — 94060 EVALUATION OF WHEEZING: CPT | Mod: 26,,, | Performed by: INTERNAL MEDICINE

## 2023-03-15 PROCEDURE — 94729 DIFFUSING CAPACITY: CPT | Mod: 26,,, | Performed by: INTERNAL MEDICINE

## 2023-03-15 PROCEDURE — 94729 DIFFUSING CAPACITY: CPT

## 2023-03-15 PROCEDURE — 94727 GAS DIL/WSHOT DETER LNG VOL: CPT

## 2023-03-15 PROCEDURE — 94727 PR PULM FUNCTION TEST BY GAS: ICD-10-PCS | Mod: 26,,, | Performed by: INTERNAL MEDICINE

## 2023-03-15 PROCEDURE — 94060 PR EVAL OF BRONCHOSPASM: ICD-10-PCS | Mod: 26,,, | Performed by: INTERNAL MEDICINE

## 2023-03-15 PROCEDURE — 25000242 PHARM REV CODE 250 ALT 637 W/ HCPCS: Performed by: INTERNAL MEDICINE

## 2023-03-15 PROCEDURE — 94060 EVALUATION OF WHEEZING: CPT

## 2023-03-15 PROCEDURE — 94727 GAS DIL/WSHOT DETER LNG VOL: CPT | Mod: 26,,, | Performed by: INTERNAL MEDICINE

## 2023-03-15 RX ORDER — ALBUTEROL SULFATE 2.5 MG/.5ML
2.5 SOLUTION RESPIRATORY (INHALATION) ONCE
Status: COMPLETED | OUTPATIENT
Start: 2023-03-15 | End: 2023-03-15

## 2023-03-15 RX ADMIN — ALBUTEROL SULFATE 2.5 MG: 2.5 SOLUTION RESPIRATORY (INHALATION) at 10:03

## 2023-03-17 ENCOUNTER — TELEPHONE (OUTPATIENT)
Dept: PULMONOLOGY | Facility: CLINIC | Age: 63
End: 2023-03-17
Payer: MEDICARE

## 2023-03-17 NOTE — TELEPHONE ENCOUNTER
Returned call no one knows whats going on.            ----- Message from Jason Wu MA sent at 3/15/2023 12:11 PM CDT -----  Radha Lopez V Staff  Caller: Unspecified (Today, 12:00 PM)  Who called: Cj Hernandez     What is the request in detail: Calling to get additional clinical information     Can the clinic reply by [facility]? no     Would the patient rather a call back or a response via [facility]? Call     Best call back number: [phone]     Additional Information:  Ref [id number]

## 2023-03-27 ENCOUNTER — TELEPHONE (OUTPATIENT)
Dept: SLEEP MEDICINE | Facility: HOSPITAL | Age: 63
End: 2023-03-27
Payer: MEDICARE

## 2023-03-28 ENCOUNTER — HOSPITAL ENCOUNTER (OUTPATIENT)
Dept: SLEEP MEDICINE | Facility: HOSPITAL | Age: 63
Discharge: HOME | End: 2023-03-28
Attending: INTERNAL MEDICINE
Payer: MEDICARE

## 2023-03-28 DIAGNOSIS — G47.33 OSA (OBSTRUCTIVE SLEEP APNEA): ICD-10-CM

## 2023-03-28 PROCEDURE — 95811 POLYSOM 6/>YRS CPAP 4/> PARM: CPT

## 2023-03-29 NOTE — PROGRESS NOTES
A Split night study was performed on [facility]. The entire procedure was explained, including Bio calibration procedure, patient was informed that there may be a need to enter the room during the night to fix lead or make adjustments to the equipment. Questions were answered prior to start of study. He was given instructions on how to call out for help including how to use the nurse call unit in the bathroom. Patient was given [employer] phone number to call if patient needed tech for anything, in case tech was out of tech room.  Patient education was performed. This includes the possible use of CPAP machine and different CPAP masks. He was given the after visit summary.   The lowest oxygen saturation observed during sleep was 83%   He met criteria for a split night study  The EKG appeared to be NSR  The patient was titrated from 5 cm H2O to 7 Cm H2O. F&P Simplus full mask (per pts request) was used with , humidity, and EPR. The pateint did not want to wear a nasal mask, he did not like nasal masks when the patient used PAP previously. The pressure of 5 cm H2O is believed to eliminate most of the events. Supine REM sleep was obtained during the study.  His reaction to PAP was it was ok.

## 2023-04-09 ENCOUNTER — TELEPHONE (OUTPATIENT)
Dept: PULMONOLOGY | Facility: CLINIC | Age: 63
End: 2023-04-09
Payer: MEDICARE

## 2023-04-09 DIAGNOSIS — G47.33 OSA (OBSTRUCTIVE SLEEP APNEA): Primary | ICD-10-CM

## 2023-04-09 PROCEDURE — 95811 POLYSOM 6/>YRS CPAP 4/> PARM: CPT | Mod: 26,,, | Performed by: INTERNAL MEDICINE

## 2023-04-09 PROCEDURE — 95811 PR POLYSOMNOGRAPHY W/CPAP: ICD-10-PCS | Mod: 26,,, | Performed by: INTERNAL MEDICINE

## 2023-04-09 NOTE — TELEPHONE ENCOUNTER
Please let patient know that sleep study reconfirmed that he still has severe sleep apnea and the titration went well.   I would like to set patient up with cpap via dme of choice.  In addition breathing test result was normal.    Clinic follow up in approximately 8 weeks after cpap usage.

## 2023-04-09 NOTE — PROCEDURES
"Dear Provider,     You have ordered sleep LAB services to perform the sleep study for Juice Sutherland.  The sleep study that you ordered is complete.      Please find Sleep Study result in "Chart Review" under the "Media tab."      As the ordering provider, you are responsible for reviewing the results and implementing a treatment plan with your patient.    If you need a Sleep Medicine provider to explain the sleep study findings and arrange treatment for the patient, please refer patient for consultation to our Sleep Clinic via [facility] with Ambulatory Consult Sleep.    To do that please place an order for an  "Ambulatory Consult Sleep" - it will go to our clinic work queue for our Medical Assistant to contact the patient for an appointment.     For any questions, please contact our clinic staff at [phone] to talk to clinical staff.   "

## 2023-04-12 ENCOUNTER — TELEPHONE (OUTPATIENT)
Dept: PULMONOLOGY | Facility: CLINIC | Age: 63
End: 2023-04-12
Payer: MEDICARE

## 2023-04-12 NOTE — TELEPHONE ENCOUNTER
Spoke with patient.              ----- Message from Ivet Sanz MA sent at 4/12/2023  8:41 AM CDT -----    ----- Message -----  From: Ivet Sanz MA  Sent: 4/10/2023   3:10 PM CDT  To: BRANDAN Sanz Staff  Caller: Unspecified (Today, 12:01 PM)  .Type: Patient Call Back     Who called:self     What is the request in detail:would like to know about his CPAP machine, how does he go about ordering   it     Can the clinic reply by [facility]?no     Would the patient rather a call back or a response via [facility]? Call     Best call back number: ..[phone]         Additional Information:

## 2023-10-04 ENCOUNTER — TELEPHONE (OUTPATIENT)
Dept: PULMONOLOGY | Facility: CLINIC | Age: 63
End: 2023-10-04
Payer: MEDICARE

## 2023-10-04 NOTE — TELEPHONE ENCOUNTER
Spoke with patient about cpap.                ----- Message from Jason Wu MA sent at 10/4/2023  9:21 AM CDT -----  Zbigniew Wolfe Staff  Caller: Unspecified (Today,  9:10 AM)  Name of Who is Calling: MEHRAN QUINTERO [[id number]]             What is the request in detail: Patient is requesting a call back.  Patient would like to know if he is up to date on all visit requirements and equipment.  Patient states a message popped up on cpap machine last week.  Patient can't recall what the message said and the message hasn't popped up again. Please assist.             Can the clinic reply by [facility]: No

## 2024-03-26 ENCOUNTER — OFFICE VISIT (OUTPATIENT)
Dept: PULMONOLOGY | Facility: CLINIC | Age: 64
End: 2024-03-26
Payer: MEDICARE

## 2024-03-26 VITALS
HEART RATE: 67 BPM | HEIGHT: 65 IN | BODY MASS INDEX: 27.42 KG/M2 | DIASTOLIC BLOOD PRESSURE: 85 MMHG | WEIGHT: 164.56 LBS | SYSTOLIC BLOOD PRESSURE: 143 MMHG | OXYGEN SATURATION: 96 %

## 2024-03-26 DIAGNOSIS — G47.33 OSA (OBSTRUCTIVE SLEEP APNEA): ICD-10-CM

## 2024-03-26 DIAGNOSIS — J44.9 CHRONIC OBSTRUCTIVE PULMONARY DISEASE, UNSPECIFIED COPD TYPE: Primary | ICD-10-CM

## 2024-03-26 PROCEDURE — 3079F DIAST BP 80-89 MM HG: CPT | Mod: CPTII,S$GLB,, | Performed by: INTERNAL MEDICINE

## 2024-03-26 PROCEDURE — 99999 PR PBB SHADOW E&M-EST. PATIENT-LVL IV: CPT | Mod: PBBFAC,,, | Performed by: INTERNAL MEDICINE

## 2024-03-26 PROCEDURE — 1159F MED LIST DOCD IN RCRD: CPT | Mod: CPTII,S$GLB,, | Performed by: INTERNAL MEDICINE

## 2024-03-26 PROCEDURE — 3008F BODY MASS INDEX DOCD: CPT | Mod: CPTII,S$GLB,, | Performed by: INTERNAL MEDICINE

## 2024-03-26 PROCEDURE — 99214 OFFICE O/P EST MOD 30 MIN: CPT | Mod: S$GLB,,, | Performed by: INTERNAL MEDICINE

## 2024-03-26 PROCEDURE — 4010F ACE/ARB THERAPY RXD/TAKEN: CPT | Mod: CPTII,S$GLB,, | Performed by: INTERNAL MEDICINE

## 2024-03-26 PROCEDURE — 3077F SYST BP >= 140 MM HG: CPT | Mod: CPTII,S$GLB,, | Performed by: INTERNAL MEDICINE

## 2024-03-26 RX ORDER — TIOTROPIUM BROMIDE INHALATION SPRAY 3.12 UG/1
5 SPRAY, METERED RESPIRATORY (INHALATION) DAILY
Qty: 4 G | Refills: 4 | Status: SHIPPED | OUTPATIENT
Start: 2024-03-26
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
## 2024-12-30 NOTE — ASSESSMENT & PLAN NOTE
Patient presented with SOB, fever and hypoxia.  Concerning for COVID 19 infection.  Awaiting test results.  Empirically started on Rocephin and Zithromax.  Supportive care and supplemental oxygen.  Titrate down oxygen as possible.  Hemodynamically stable.  Continue current management and hopefully home soon.   You can access the FollowMyHealth Patient Portal offered by [facility] by registering at the following website: [email or web address]. By joining [employer]’s FollowMyHealth portal, you will also be able to view your health information using other applications (apps) compatible with our system.